# Patient Record
Sex: MALE | Race: WHITE | NOT HISPANIC OR LATINO | Employment: FULL TIME | URBAN - METROPOLITAN AREA
[De-identification: names, ages, dates, MRNs, and addresses within clinical notes are randomized per-mention and may not be internally consistent; named-entity substitution may affect disease eponyms.]

---

## 2017-06-08 ENCOUNTER — ANESTHESIA EVENT (OUTPATIENT)
Dept: SURGERY | Facility: HOSPITAL | Age: 32
End: 2017-06-08
Payer: OTHER MISCELLANEOUS

## 2017-06-08 ENCOUNTER — HOSPITAL ENCOUNTER (EMERGENCY)
Facility: HOSPITAL | Age: 32
Discharge: HOME | End: 2017-06-08
Attending: FAMILY MEDICINE
Payer: OTHER MISCELLANEOUS

## 2017-06-08 ENCOUNTER — HOSPITAL ENCOUNTER (OUTPATIENT)
Facility: HOSPITAL | Age: 32
LOS: 1 days | Discharge: HOME | End: 2017-06-10
Attending: EMERGENCY MEDICINE | Admitting: ORTHOPAEDIC SURGERY
Payer: OTHER MISCELLANEOUS

## 2017-06-08 ENCOUNTER — ANESTHESIA (OUTPATIENT)
Dept: SURGERY | Facility: HOSPITAL | Age: 32
End: 2017-06-08
Payer: OTHER MISCELLANEOUS

## 2017-06-08 VITALS
TEMPERATURE: 98 F | WEIGHT: 180 LBS | SYSTOLIC BLOOD PRESSURE: 122 MMHG | HEART RATE: 69 BPM | OXYGEN SATURATION: 99 % | RESPIRATION RATE: 18 BRPM | BODY MASS INDEX: 25.77 KG/M2 | HEIGHT: 70 IN | DIASTOLIC BLOOD PRESSURE: 64 MMHG

## 2017-06-08 DIAGNOSIS — S82.402A TIBIA/FIBULA FRACTURE, LEFT, CLOSED, INITIAL ENCOUNTER: Primary | ICD-10-CM
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
## 2017-06-08 DIAGNOSIS — S82.402A TIBIA/FIBULA FRACTURE, LEFT, CLOSED, INITIAL ENCOUNTER: ICD-10-CM

## 2017-06-08 DIAGNOSIS — S82.872A CLOSED DISPLACED PILON FRACTURE OF LEFT TIBIA: ICD-10-CM
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
## 2017-06-08 DIAGNOSIS — M79.606 LEG PAIN: ICD-10-CM

## 2017-06-08 DIAGNOSIS — S82.872A CLOSED DISPLACED PILON FRACTURE OF LEFT TIBIA, INITIAL ENCOUNTER: ICD-10-CM

## 2017-06-08 DIAGNOSIS — S82.202A TIBIA/FIBULA FRACTURE, LEFT, CLOSED, INITIAL ENCOUNTER: ICD-10-CM

## 2017-06-08 DIAGNOSIS — S82.872A CLOSED LEFT PILON FRACTURE, INITIAL ENCOUNTER: Primary | ICD-10-CM

## 2017-06-08 DIAGNOSIS — S82.202A TIBIA/FIBULA FRACTURE, LEFT, CLOSED, INITIAL ENCOUNTER: Primary | ICD-10-CM

## 2017-06-08 LAB
ALBUMIN SERPL BCP-MCNC: 4.3 G/DL
ALP SERPL-CCNC: 61 U/L
ALT SERPL W/O P-5'-P-CCNC: 25 U/L
ANION GAP SERPL CALC-SCNC: 10 MMOL/L
AST SERPL-CCNC: 33 U/L
BASOPHILS # BLD AUTO: 0.02 K/UL
BASOPHILS NFR BLD: 0.1 %
BILIRUB SERPL-MCNC: 0.6 MG/DL
BUN SERPL-MCNC: 10 MG/DL
CALCIUM SERPL-MCNC: 9.3 MG/DL
CHLORIDE SERPL-SCNC: 105 MMOL/L
CO2 SERPL-SCNC: 24 MMOL/L
CREAT SERPL-MCNC: 1 MG/DL
DIFFERENTIAL METHOD: ABNORMAL
EOSINOPHIL # BLD AUTO: 0 K/UL
EOSINOPHIL NFR BLD: 0.1 %
ERYTHROCYTE [DISTWIDTH] IN BLOOD BY AUTOMATED COUNT: 12.6 %
EST. GFR  (AFRICAN AMERICAN): >60 ML/MIN/1.73 M^2
EST. GFR  (NON AFRICAN AMERICAN): >60 ML/MIN/1.73 M^2
GLUCOSE SERPL-MCNC: 109 MG/DL
HCT VFR BLD AUTO: 42.8 %
HGB BLD-MCNC: 14.1 G/DL
INR PPP: 1
LYMPHOCYTES # BLD AUTO: 1.4 K/UL
LYMPHOCYTES NFR BLD: 6.6 %
MCH RBC QN AUTO: 30.7 PG
MCHC RBC AUTO-ENTMCNC: 32.9 %
MCV RBC AUTO: 93 FL
MONOCYTES # BLD AUTO: 0.9 K/UL
MONOCYTES NFR BLD: 4.2 %
NEUTROPHILS # BLD AUTO: 18.9 K/UL
NEUTROPHILS NFR BLD: 88.6 %
PLATELET # BLD AUTO: 215 K/UL
PMV BLD AUTO: 10.4 FL
POTASSIUM SERPL-SCNC: 3.7 MMOL/L
PROT SERPL-MCNC: 7.2 G/DL
PROTHROMBIN TIME: 10.6 SEC
RBC # BLD AUTO: 4.59 M/UL
SODIUM SERPL-SCNC: 139 MMOL/L
WBC # BLD AUTO: 21.29 K/UL

## 2017-06-08 PROCEDURE — 80053 COMPREHEN METABOLIC PANEL: CPT

## 2017-06-08 PROCEDURE — 71000033 HC RECOVERY, INTIAL HOUR: Performed by: ORTHOPAEDIC SURGERY

## 2017-06-08 PROCEDURE — 96374 THER/PROPH/DIAG INJ IV PUSH: CPT

## 2017-06-08 PROCEDURE — 37000008 HC ANESTHESIA 1ST 15 MINUTES: Performed by: ORTHOPAEDIC SURGERY

## 2017-06-08 PROCEDURE — 85610 PROTHROMBIN TIME: CPT

## 2017-06-08 PROCEDURE — 96372 THER/PROPH/DIAG INJ SC/IM: CPT

## 2017-06-08 PROCEDURE — 99220 PR INITIAL OBSERVATION CARE,LEVL III: CPT | Mod: 57,,, | Performed by: ORTHOPAEDIC SURGERY

## 2017-06-08 PROCEDURE — 63600175 PHARM REV CODE 636 W HCPCS: Performed by: ORTHOPAEDIC SURGERY

## 2017-06-08 PROCEDURE — 63600175 PHARM REV CODE 636 W HCPCS: Performed by: FAMILY MEDICINE

## 2017-06-08 PROCEDURE — 29515 APPLICATION SHORT LEG SPLINT: CPT | Mod: LT

## 2017-06-08 PROCEDURE — 37000009 HC ANESTHESIA EA ADD 15 MINS: Performed by: ORTHOPAEDIC SURGERY

## 2017-06-08 PROCEDURE — 99283 EMERGENCY DEPT VISIT LOW MDM: CPT | Mod: 27

## 2017-06-08 PROCEDURE — 27201423 OPTIME MED/SURG SUP & DEVICES STERILE SUPPLY: Performed by: ORTHOPAEDIC SURGERY

## 2017-06-08 PROCEDURE — 99285 EMERGENCY DEPT VISIT HI MDM: CPT | Mod: ,,, | Performed by: EMERGENCY MEDICINE

## 2017-06-08 PROCEDURE — G0378 HOSPITAL OBSERVATION PER HR: HCPCS

## 2017-06-08 PROCEDURE — 63600175 PHARM REV CODE 636 W HCPCS: Performed by: STUDENT IN AN ORGANIZED HEALTH CARE EDUCATION/TRAINING PROGRAM

## 2017-06-08 PROCEDURE — 20690 APPL UNIPLN UNI EXT FIXJ SYS: CPT | Mod: ,,, | Performed by: ORTHOPAEDIC SURGERY

## 2017-06-08 PROCEDURE — 25000003 PHARM REV CODE 250: Performed by: ORTHOPAEDIC SURGERY

## 2017-06-08 PROCEDURE — 99285 EMERGENCY DEPT VISIT HI MDM: CPT | Mod: 25

## 2017-06-08 PROCEDURE — C1713 ANCHOR/SCREW BN/BN,TIS/BN: HCPCS | Performed by: ORTHOPAEDIC SURGERY

## 2017-06-08 PROCEDURE — 71000039 HC RECOVERY, EACH ADD'L HOUR: Performed by: ORTHOPAEDIC SURGERY

## 2017-06-08 PROCEDURE — 36000708 HC OR TIME LEV III 1ST 15 MIN: Performed by: ORTHOPAEDIC SURGERY

## 2017-06-08 PROCEDURE — 36000709 HC OR TIME LEV III EA ADD 15 MIN: Performed by: ORTHOPAEDIC SURGERY

## 2017-06-08 PROCEDURE — 85025 COMPLETE CBC W/AUTO DIFF WBC: CPT

## 2017-06-08 PROCEDURE — 27825 TREAT LOWER LEG FRACTURE: CPT | Mod: 51,,, | Performed by: ORTHOPAEDIC SURGERY

## 2017-06-08 PROCEDURE — 96376 TX/PRO/DX INJ SAME DRUG ADON: CPT

## 2017-06-08 DEVICE — SCREW SCHANZ 5/60/150 294.784: Type: IMPLANTABLE DEVICE | Site: ANKLE | Status: FUNCTIONAL

## 2017-06-08 DEVICE — PIN STNMN FIX EXT 5X250MM SS: Type: IMPLANTABLE DEVICE | Site: ANKLE | Status: FUNCTIONAL

## 2017-06-08 RX ORDER — SODIUM CHLORIDE 9 MG/ML
INJECTION, SOLUTION INTRAVENOUS CONTINUOUS
Status: DISCONTINUED | OUTPATIENT
Start: 2017-06-08 | End: 2017-06-10 | Stop reason: HOSPADM

## 2017-06-08 RX ORDER — HYDROMORPHONE HYDROCHLORIDE 1 MG/ML
0.5 INJECTION, SOLUTION INTRAMUSCULAR; INTRAVENOUS; SUBCUTANEOUS
Status: COMPLETED | OUTPATIENT
Start: 2017-06-08 | End: 2017-06-08

## 2017-06-08 RX ORDER — HYDROMORPHONE HYDROCHLORIDE 1 MG/ML
1 INJECTION, SOLUTION INTRAMUSCULAR; INTRAVENOUS; SUBCUTANEOUS
Status: COMPLETED | OUTPATIENT
Start: 2017-06-08 | End: 2017-06-08

## 2017-06-08 RX ORDER — HYDROCODONE BITARTRATE AND ACETAMINOPHEN 10; 325 MG/1; MG/1
1 TABLET ORAL EVERY 4 HOURS PRN
Qty: 18 TABLET | Refills: 0 | Status: ON HOLD | OUTPATIENT
Start: 2017-06-08 | End: 2017-06-28 | Stop reason: HOSPADM

## 2017-06-08 RX ORDER — ONDANSETRON 2 MG/ML
4 INJECTION INTRAMUSCULAR; INTRAVENOUS
Status: COMPLETED | OUTPATIENT
Start: 2017-06-08 | End: 2017-06-08

## 2017-06-08 RX ORDER — SODIUM CHLORIDE 0.9 % (FLUSH) 0.9 %
3 SYRINGE (ML) INJECTION
Status: DISCONTINUED | OUTPATIENT
Start: 2017-06-08 | End: 2017-06-10 | Stop reason: HOSPADM

## 2017-06-08 RX ORDER — MUPIROCIN 20 MG/G
1 OINTMENT TOPICAL
Status: CANCELLED | OUTPATIENT
Start: 2017-06-08

## 2017-06-08 RX ORDER — HYDROMORPHONE HYDROCHLORIDE 1 MG/ML
0.5 INJECTION, SOLUTION INTRAMUSCULAR; INTRAVENOUS; SUBCUTANEOUS
Status: DISCONTINUED | OUTPATIENT
Start: 2017-06-08 | End: 2017-06-10 | Stop reason: HOSPADM

## 2017-06-08 RX ADMIN — ONDANSETRON 4 MG: 2 INJECTION INTRAMUSCULAR; INTRAVENOUS at 10:06

## 2017-06-08 RX ADMIN — HYDROMORPHONE HYDROCHLORIDE 1 MG: 1 INJECTION, SOLUTION INTRAMUSCULAR; INTRAVENOUS; SUBCUTANEOUS at 03:06

## 2017-06-08 RX ADMIN — SODIUM CHLORIDE: 0.9 INJECTION, SOLUTION INTRAVENOUS at 09:06

## 2017-06-08 RX ADMIN — HYDROMORPHONE HYDROCHLORIDE 1 MG: 1 INJECTION, SOLUTION INTRAMUSCULAR; INTRAVENOUS; SUBCUTANEOUS at 06:06

## 2017-06-08 RX ADMIN — HYDROMORPHONE HYDROCHLORIDE 0.5 MG: 1 INJECTION, SOLUTION INTRAMUSCULAR; INTRAVENOUS; SUBCUTANEOUS at 10:06

## 2017-06-08 RX ADMIN — HYDROMORPHONE HYDROCHLORIDE 0.5 MG: 1 INJECTION, SOLUTION INTRAMUSCULAR; INTRAVENOUS; SUBCUTANEOUS at 07:06

## 2017-06-08 NOTE — ED TRIAGE NOTES
I was at work and working and [employer] mat when it was lifted shot my way and hit me in the left lower leg. Obvious deformity to left lower leg. Lower leg splint placed by work crew with ace wrap.

## 2017-06-08 NOTE — ED PROVIDER NOTES
"Encounter Date: 6/8/2017       History     Chief Complaint   Patient presents with    Leg Pain     I was at work and working and wooden duane mat when it was lifted shot my way and hit me in the left lower leg. Obvious deformity to left lower leg.      Review of patient's allergies indicates:  No Known Allergies  31-year-old male injured his right leg while moving heavy wooden pallets ("pads").  Patient states was attempting to adjust the raking at which time the pad began to swing and hit him in the left leg.  Did attempt to turn run but was too slow.  An obvious deformity was noted and the leg was splinted on scene.  States the pain is presently a 1-2 out of 10 in intensity.          History reviewed. No pertinent past medical history.  Past Surgical History:   Procedure Laterality Date    leg       fractured     History reviewed. No pertinent family history.  Social History   Substance Use Topics    Smoking status: Current Every Day Smoker     Packs/day: 0.75     Types: Cigarettes    Smokeless tobacco: Not on file    Alcohol use Yes      Comment: weekends only     Review of Systems   Respiratory: Negative for shortness of breath.    Cardiovascular: Negative for chest pain.   All other systems reviewed and are negative.      Physical Exam     Initial Vitals [06/08/17 1024]   BP Pulse Resp Temp SpO2   126/70 84 15 97.7 °F (36.5 °C) 98 %     Physical Exam    Nursing note and vitals reviewed.  Constitutional: He appears well-developed and well-nourished.   HENT:   Head: Normocephalic and atraumatic.   Eyes: EOM are normal. Pupils are equal, round, and reactive to light.   Neck: Normal range of motion. Neck supple.   Cardiovascular: Normal rate, regular rhythm, normal heart sounds and intact distal pulses.   Pulmonary/Chest: Breath sounds normal.   Abdominal: Soft.   Musculoskeletal: He exhibits tenderness. He exhibits no edema.   Obvious left tib-fib fracture bruising noted no open fractures observed no skin " tenting.   Neurological: He is alert and oriented to person, place, and time. No sensory deficit.   Skin: Skin is warm. Capillary refill takes less than 2 seconds.   Psychiatric: He has a normal mood and affect. His behavior is normal.         ED Course   Splint Application  Date/Time: 6/8/2017 11:25 AM  Performed by: ABRAM JOSHI  Authorized by: ABRAM JOSHI   Consent Done: Not Needed  Location details: left leg  Supplies used: cotton padding,  elastic bandage and Ortho-Glass  Post-procedure: The splinted body part was neurovascularly unchanged following the procedure.  Patient tolerance: Patient tolerated the procedure well with no immediate complications        Labs Reviewed - No data to display                            ED Course     Clinical Impression:   The primary encounter diagnosis was Tibia/fibula fracture, left, closed, initial encounter. A diagnosis of Leg pain was also pertinent to this visit.          Abram Joshi MD  06/08/17 1148

## 2017-06-08 NOTE — ANESTHESIA PREPROCEDURE EVALUATION
Pre-operative evaluation for Procedure(s) (LRB):  APPLICATION-EXTERNAL FIXATION DEVICE - left ankle spanning (Left)    Nick Medel is a 31 y.o. male w/ PMHx of closed pilon fx of L tibia who presents for above procedure. No other significant PMHx. No hx of anesthesia complications.     LDA:   L forearm 20 g PIV     Prev airway: none on record       Patient Active Problem List   Diagnosis    Closed displaced pilon fracture of left tibia       Review of patient's allergies indicates:  No Known Allergies     No current facility-administered medications on file prior to encounter.      Current Outpatient Prescriptions on File Prior to Encounter   Medication Sig Dispense Refill    hydrocodone-acetaminophen 10-325mg (NORCO)  mg Tab Take 1 tablet by mouth every 4 (four) hours as needed for Pain. 18 tablet 0       Past Surgical History:   Procedure Laterality Date    leg       fractured    LEG SURGERY Left        Social History     Social History    Marital status: Single     Spouse name: N/A    Number of children: N/A    Years of education: N/A     Occupational History    Not on file.     Social History Main Topics    Smoking status: Current Every Day Smoker     Packs/day: 0.75     Types: Cigarettes    Smokeless tobacco: Not on file    Alcohol use Yes      Comment: weekends only    Drug use: No    Sexual activity: Not on file     Other Topics Concern    Not on file     Social History Narrative    No narrative on file         Vital Signs Range (Last 24H):  Temp:  [36.5 °C (97.7 °F)-36.8 °C (98.3 °F)]   Pulse:  [66-84]   Resp:  [15-18]   BP: (122-146)/(64-83)   SpO2:  [98 %-99 %]       CBC:   Recent Labs      06/08/17   1539   WBC  21.29*   RBC  4.59*   HGB  14.1   HCT  42.8   PLT  215   MCV  93   MCH  30.7   MCHC  32.9       CMP:   Recent Labs      06/08/17   1539   NA  139   K  3.7   CL  105   CO2  24   BUN  10   CREATININE  1.0   GLU  109    CALCIUM  9.3   ALBUMIN  4.3   PROT  7.2   ALKPHOS  61   ALT  25   AST  33   BILITOT  0.6       INR  Recent Labs      06/08/17   1539   INR  1.0       Anesthesia Evaluation    I have reviewed the Patient Summary Reports.     I have reviewed the Medications.     Review of Systems  Anesthesia Hx:  No problems with previous Anesthesia Denies Hx of Anesthetic complications  History of prior surgery of interest to airway management or planning: Previous anesthesia: General  Denies Personal Hx of Anesthesia complications.   Social:  Smoker, Social Alcohol Use    Hematology/Oncology:  Hematology Normal   Oncology Normal     EENT/Dental:EENT/Dental Normal   Cardiovascular:  Cardiovascular Normal     Pulmonary:  Pulmonary Normal    Renal/:  Renal/ Normal     Hepatic/GI:  Hepatic/GI Normal    Musculoskeletal:   LE Fx per HPI    Neurological:  Neurology Normal    Endocrine:  Endocrine Normal    Dermatological:  Skin Normal    Psych:  Psychiatric Normal           Physical Exam  General:  Well nourished    Airway/Jaw/Neck:  Airway Findings: Mouth Opening: Normal Tongue: Normal  General Airway Assessment: Adult  Mallampati: II  Improves to I with phonation.  TM Distance: Normal, at least 6 cm  Jaw/Neck Findings:  Neck ROM: Normal ROM     Eyes/Ears/Nose:  EYES/EARS/NOSE FINDINGS: Normal   Dental:  Dental Findings: In tact, upper partial dentures        Mental Status:  Mental Status Findings:  Cooperative, Alert and Oriented         Anesthesia Plan  Type of Anesthesia, risks & benefits discussed:  Anesthesia Type:  spinal, general  Patient's Preference: GA  Intra-op Monitoring Plan: standard ASA monitors  Intra-op Monitoring Plan Comments:   Post Op Pain Control Plan: IV/PO Opioids PRN  Post Op Pain Control Plan Comments:   Induction:   IV  Beta Blocker:  Patient is not currently on a Beta-Blocker (No further documentation required).       Informed Consent: Patient understands risks and agrees with Anesthesia plan.  Questions  answered. Anesthesia consent signed with patient.  ASA Score: 2     Day of Surgery Review of History & Physical:  There are no significant changes.  H&P update referred to the surgeon.         Ready For Surgery From Anesthesia Perspective.

## 2017-06-08 NOTE — ED NOTES
Ortho paged, informed patient c/o toes tingling. Cap refill <3, + pedal pulses. Ordered to elevate and keep ice. Will monitor  Call to [phone] with further concerns

## 2017-06-08 NOTE — HPI
Patient is a 31 y.o. male with h/o left gavin fx s/p ORIF (~2007 [place]) presents with left ankle pain.  He was working when a boom hit his left ankle.  He noticed immediate pain and inability to bear weight and was taken to [facility] where he was placed in a splint and directed to follow up with orthopedic surgeon.  They elected to [facility].  Endorses some paresthesias diffusely to foot.  No other injuries.

## 2017-06-08 NOTE — H&P
[facility]  Orthopedics  H&P    Patient Name: Nick Medel  MRN: [id number]  Admission Date: 6/8/2017  Primary Care Provider: Primary Doctor No    Patient information was obtained from patient and ER records.     Subjective:     Principal Problem:Closed displaced pilon fracture of left tibia    Chief Complaint:   Chief Complaint   Patient presents with    Tib/Fib Fracture/Transfer        HPI: Patient is a 31 y.o. male with h/o left gavin fx s/p ORIF (~2007 [place]) presents with left ankle pain.  He was working when a boom hit his left ankle.  He noticed immediate pain and inability to bear weight and was taken to [facility] where he was placed in a splint and directed to follow up with orthopedic surgeon.  They elected to come to [facility] ED.  Endorses some paresthesias diffusely to foot.  No other injuries.    History reviewed. No pertinent past medical history.    Past Surgical History:   Procedure Laterality Date    leg       fractured    LEG SURGERY Left        Review of patient's allergies indicates:  No Known Allergies    No current facility-administered medications for this encounter.      Current Outpatient Prescriptions   Medication Sig    hydrocodone-acetaminophen 10-325mg (NORCO)  mg Tab Take 1 tablet by mouth every 4 (four) hours as needed for Pain.     Family History     None        Social History Main Topics    Smoking status: Current Every Day Smoker     Packs/day: 0.75     Types: Cigarettes    Smokeless tobacco: Not on file    Alcohol use Yes      Comment: weekends only    Drug use: No    Sexual activity: Not on file     ROS     See HPI    Objective:     Vital Signs (Most Recent):  Temp: 98.3 °F (36.8 °C) (06/08/17 1422)  Pulse: 66 (06/08/17 1422)  Resp: 16 (06/08/17 1422)  BP: (!) 146/83 (06/08/17 1422)  SpO2: 99 % (06/08/17 1422) Vital Signs (24h Range):  Temp:  [97.7 °F (36.5 °C)-98.3 °F (36.8 °C)] 98.3 °F (36.8 °C)  Pulse:  [66-84] 66  Resp:  [15-18]  "16  SpO2:  [98 %-99 %] 99 %  BP: (122-146)/(64-83) 146/83     Weight: 81.6 kg (180 lb)  Height: 5' 10" (177.8 cm)  Body mass index is 25.83 kg/m².    No intake or output data in the 24 hours ending 06/08/17 1628    Ortho/SPM Exam    Gen:  No acute distress  CV:  Peripherally well-perfused.  Pulses 2+ bilaterally.  Lungs:  Normal respiratory effort.  Head/Neck:  Normocephalic.  Atraumatic.   Neuro:  CN intact without deficit, SILT throughout B/L Upper & Lower Extremities  Pelvis: No TTP, Stable to direct anterior pressure over ASIS.    MSK:  LLE:  - moderate-severe swelling to lower leg  - 2 superficial scrapes anterior, do not probe deep  - all compartments assessed in isolation, all are very swollen but are compressible  - ttp to distal tibia and fibular shaft  - no pain with PROM of foot/toes/ankle  - limited AROM foot/ankle 2/2 pain  - TA/EHL/Gastroc/FHL assessed in isolation without deficit  - SILT throughout  - DP and PT palpated  2+  - Capillary Refill <3s       Significant Labs:   BMP: No results for input(s): GLU, NA, K, CL, CO2, BUN, CREATININE, CALCIUM, MG in the last 48 hours.  CBC:   Recent Labs  Lab 06/08/17  1539   WBC 21.29*   HGB 14.1   HCT 42.8        All pertinent labs within the past 24 hours have been reviewed.    Significant Imaging:   Left pilon and fibular shaft fx    Assessment/Plan:     * Closed displaced pilon fracture of left tibia    Currently does not have compartment syndrome, however he is quite swollen and there is concern for potential development of this.    - will admit to orthopedics and take emergently to OR for ex-fix: booked/marked/consent obtained  - NPO  - pain control  - elevate/ice at all times  - bed rest            Mohit Cisneros MD  Orthopedics  [facility]    "

## 2017-06-08 NOTE — ASSESSMENT & PLAN NOTE
Currently does not have compartment syndrome, however he is quite swollen and there is concern for potential development of this.    - will admit to orthopedics and take emergently to OR for ex-fix: booked/marked/consent obtained  - NPO  - pain control  - elevate/ice at all times  - bed rest

## 2017-06-08 NOTE — ED NOTES
Dr. Hanson's office contacted.  Report given to Nancy due to Dr. Hanson in [place].  Nancy requested pt present to office as soon as TRENT'd.  Dr. Adi paredes.

## 2017-06-08 NOTE — ED PROVIDER NOTES
Encounter Date: 6/8/2017       History     Chief Complaint   Patient presents with    Tib/Fib Fracture/Transfer     Review of patient's allergies indicates:  No Known Allergies  Mr. Medel is a 31 year old male with past medical history significant for a prior fibula fracture who presents as a transfer for management of tib/fib facture. He reports that he sustained the injury while at work. His left lower extremity was pinned between two heavy rugs. He was taken to the closest hospital for evaluation. While there xrays were taken. Xrays revealed comminuted and displaced fractures of the distal tibial shaft and mid fibular shaft. He reports numbness and tingling in his left distal lower extremity. He endorses significant pain. He is unable to bare weight on his lower extremity. He states that he had a prior fibular fracture which was repaired at [facility]. He reports that he believes that he has received a tetanus shot within the last 5 years. He also states that he received antibiotics at the outside facility.           History reviewed. No pertinent past medical history.  Past Surgical History:   Procedure Laterality Date    leg       fractured    LEG SURGERY Left      History reviewed. No pertinent family history.  Social History   Substance Use Topics    Smoking status: Current Every Day Smoker     Packs/day: 0.75     Types: Cigarettes    Smokeless tobacco: Not on file    Alcohol use Yes      Comment: weekends only     Review of Systems   Constitutional: Negative for chills, diaphoresis and fever.   HENT: Negative for congestion, sneezing and sore throat.    Eyes: Negative for visual disturbance.   Respiratory: Negative for cough, chest tightness, shortness of breath and wheezing.    Cardiovascular: Negative for chest pain, palpitations and leg swelling.   Gastrointestinal: Negative for abdominal distention, abdominal pain, constipation, diarrhea, nausea and vomiting.   Genitourinary: Negative for decreased  urine volume, difficulty urinating, dysuria, flank pain, frequency and urgency.   Musculoskeletal: Negative for neck pain and neck stiffness.   Skin: Negative for pallor, rash and wound.   Allergic/Immunologic: Negative for immunocompromised state.   Neurological: Positive for numbness. Negative for dizziness, tremors, weakness, light-headedness and headaches.        Tingling      Hematological: Does not bruise/bleed easily.   Psychiatric/Behavioral: Negative for agitation and behavioral problems.       Physical Exam     Initial Vitals [06/08/17 1422]   BP Pulse Resp Temp SpO2   (!) 146/83 66 16 98.3 °F (36.8 °C) 99 %     Physical Exam    Nursing note and vitals reviewed.  Constitutional: He appears well-developed and well-nourished. He is not diaphoretic. He appears distressed.   HENT:   Head: Normocephalic.   Eyes: Conjunctivae and EOM are normal. Pupils are equal, round, and reactive to light. No scleral icterus.   Neck: Normal range of motion. Neck supple. No JVD present.   Cardiovascular: Normal rate, regular rhythm, normal heart sounds and intact distal pulses. Exam reveals no gallop and no friction rub.    No murmur heard.  Pulmonary/Chest: Breath sounds normal. No respiratory distress. He has no wheezes. He has no rhonchi. He has no rales. He exhibits no tenderness.   Abdominal: Soft. Bowel sounds are normal. He exhibits no distension and no mass. There is no tenderness. There is no rebound and no guarding.   Musculoskeletal: Normal range of motion. He exhibits edema and tenderness.   Patient's dressing was taken down with orthopedic surgery. Two lacerations on the proximal and middle medial shin appreciated. Significant edema of his lower extremity is present. In addition he has scatter ecchymosis.    Neurological: He is alert and oriented to person, place, and time. He has normal strength and normal reflexes. He displays normal reflexes. No cranial nerve deficit or sensory deficit.   Skin: Skin is warm and  dry. No pallor.   Psychiatric: He has a normal mood and affect. Thought content normal.         ED Course   Procedures  Labs Reviewed   CBC W/ AUTO DIFFERENTIAL - Abnormal; Notable for the following:        Result Value    WBC 21.29 (*)     RBC 4.59 (*)     Gran # 18.9 (*)     Gran% 88.6 (*)     Lymph% 6.6 (*)     All other components within normal limits   COMPREHENSIVE METABOLIC PANEL   PROTIME-INR             Medical Decision Making:   Initial Assessment:   Mr. Medel is a 31 year old male who presents as a transfer for orthopedic evaluation for a tib/fib fracture. Xrays reviewed. Patient was in significant pain on arrival. IV dilaudid was ordered for patient comfort. Patient reports numbness and tingling in his lower extremity. Dressing was taken down. Patients sensation and pulses are intact. Significant swelling appreciated. Orthopedic surgery was consulted for urgent evaluation.   Differential Diagnosis:   Differential diagnosis is a tibia and fib fracture.    ED Management:  He was seen at 1500.  He was discussed with staff at 1510.  Orthopedic surgery was consulted for evaluation. Orthopedic surgery evaluated the patient at 1515.  Orthopedic surgery to order ankle and knee series xrays.   Patient will be taken for fixation and then subsequently admitted to the orthopedic service for further management and care.               Attending Attestation:   Physician Attestation Statement for Resident:  As the supervising MD   Physician Attestation Statement: I have personally seen and examined this patient.   I agree with the above history. -: Emergent evaluation of left tib/fib fracture transferred from outside hospital. Has firm calf but no other evidence of compartment syndrome, 2+ pulse in DP. Will get pre-op labs, patient will be admitted to ortho for operative management.    As the supervising MD I agree with the above PE.    As the supervising MD I agree with the above treatment, course, plan, and  disposition.  I have reviewed and agree with the residents interpretation of the following: lab data and x-rays.  I have reviewed the following: old records at this facility.                    ED Course     Clinical Impression:   The primary encounter diagnosis was Closed left pilon fracture, initial encounter. Diagnoses of Tibia/fibula fracture, left, closed, initial encounter and Closed displaced pilon fracture of left tibia, initial encounter were also pertinent to this visit.    Disposition:   Disposition: Admitted  Condition: Stephanie Dallas MD  Resident  06/09/17 0615       David Dejesus MD  06/09/17 9480

## 2017-06-08 NOTE — SUBJECTIVE & OBJECTIVE
"History reviewed. No pertinent past medical history.    Past Surgical History:   Procedure Laterality Date    leg       fractured    LEG SURGERY Left        Review of patient's allergies indicates:  No Known Allergies    No current facility-administered medications for this encounter.      Current Outpatient Prescriptions   Medication Sig    hydrocodone-acetaminophen 10-325mg (NORCO)  mg Tab Take 1 tablet by mouth every 4 (four) hours as needed for Pain.     Family History     None        Social History Main Topics    Smoking status: Current Every Day Smoker     Packs/day: 0.75     Types: Cigarettes    Smokeless tobacco: Not on file    Alcohol use Yes      Comment: weekends only    Drug use: No    Sexual activity: Not on file     ROS     See HPI    Objective:     Vital Signs (Most Recent):  Temp: 98.3 °F (36.8 °C) (06/08/17 1422)  Pulse: 66 (06/08/17 1422)  Resp: 16 (06/08/17 1422)  BP: (!) 146/83 (06/08/17 1422)  SpO2: 99 % (06/08/17 1422) Vital Signs (24h Range):  Temp:  [97.7 °F (36.5 °C)-98.3 °F (36.8 °C)] 98.3 °F (36.8 °C)  Pulse:  [66-84] 66  Resp:  [15-18] 16  SpO2:  [98 %-99 %] 99 %  BP: (122-146)/(64-83) 146/83     Weight: 81.6 kg (180 lb)  Height: 5' 10" (177.8 cm)  Body mass index is 25.83 kg/m².    No intake or output data in the 24 hours ending 06/08/17 1628    Ortho/SPM Exam    Gen:  No acute distress  CV:  Peripherally well-perfused.  Pulses 2+ bilaterally.  Lungs:  Normal respiratory effort.  Head/Neck:  Normocephalic.  Atraumatic.   Neuro:  CN intact without deficit, SILT throughout B/L Upper & Lower Extremities  Pelvis: No TTP, Stable to direct anterior pressure over ASIS.    MSK:  LLE:  - moderate-severe swelling to lower leg  - 2 superficial scrapes anterior, do not probe deep  - all compartments assessed in isolation, all are very swollen but are compressible  - ttp to distal tibia and fibular shaft  - no pain with PROM of foot/toes/ankle  - limited AROM foot/ankle 2/2 pain  - " TA/EHL/Gastroc/FHL assessed in isolation without deficit  - SILT throughout  - DP and PT palpated  2+  - Capillary Refill <3s       Significant Labs:   BMP: No results for input(s): GLU, NA, K, CL, CO2, BUN, CREATININE, CALCIUM, MG in the last 48 hours.  CBC:   Recent Labs  Lab 06/08/17  1539   WBC 21.29*   HGB 14.1   HCT 42.8        All pertinent labs within the past 24 hours have been reviewed.    Significant Imaging:   Left pilon and fibular shaft fx

## 2017-06-09 DIAGNOSIS — S82.872A CLOSED TRAUMATIC DISPLACED FRACTURE OF LEFT TIBIAL PLAFOND: Primary | ICD-10-CM

## 2017-06-09 PROCEDURE — 25000003 PHARM REV CODE 250: Performed by: STUDENT IN AN ORGANIZED HEALTH CARE EDUCATION/TRAINING PROGRAM

## 2017-06-09 PROCEDURE — G0378 HOSPITAL OBSERVATION PER HR: HCPCS

## 2017-06-09 PROCEDURE — G8978 MOBILITY CURRENT STATUS: HCPCS | Mod: CH

## 2017-06-09 PROCEDURE — 97165 OT EVAL LOW COMPLEX 30 MIN: CPT

## 2017-06-09 PROCEDURE — G8987 SELF CARE CURRENT STATUS: HCPCS | Mod: CI

## 2017-06-09 PROCEDURE — G8979 MOBILITY GOAL STATUS: HCPCS | Mod: CH

## 2017-06-09 PROCEDURE — G8989 SELF CARE D/C STATUS: HCPCS | Mod: CI

## 2017-06-09 PROCEDURE — G8980 MOBILITY D/C STATUS: HCPCS | Mod: CH

## 2017-06-09 PROCEDURE — 25000003 PHARM REV CODE 250: Performed by: ORTHOPAEDIC SURGERY

## 2017-06-09 PROCEDURE — 36000708 HC OR TIME LEV III 1ST 15 MIN: Performed by: ORTHOPAEDIC SURGERY

## 2017-06-09 PROCEDURE — G8988 SELF CARE GOAL STATUS: HCPCS | Mod: CI

## 2017-06-09 PROCEDURE — 97161 PT EVAL LOW COMPLEX 20 MIN: CPT

## 2017-06-09 PROCEDURE — D9220A PRA ANESTHESIA: Mod: ,,, | Performed by: ANESTHESIOLOGY

## 2017-06-09 PROCEDURE — 63600175 PHARM REV CODE 636 W HCPCS: Performed by: STUDENT IN AN ORGANIZED HEALTH CARE EDUCATION/TRAINING PROGRAM

## 2017-06-09 PROCEDURE — 25000003 PHARM REV CODE 250: Performed by: NURSE ANESTHETIST, CERTIFIED REGISTERED

## 2017-06-09 PROCEDURE — 97116 GAIT TRAINING THERAPY: CPT

## 2017-06-09 PROCEDURE — 63600175 PHARM REV CODE 636 W HCPCS: Performed by: NURSE ANESTHETIST, CERTIFIED REGISTERED

## 2017-06-09 PROCEDURE — 36000709 HC OR TIME LEV III EA ADD 15 MIN: Performed by: ORTHOPAEDIC SURGERY

## 2017-06-09 RX ORDER — ONDANSETRON 8 MG/1
8 TABLET, ORALLY DISINTEGRATING ORAL EVERY 6 HOURS PRN
Qty: 60 TABLET | Refills: 0 | Status: SHIPPED | OUTPATIENT
Start: 2017-06-09

## 2017-06-09 RX ORDER — DOCUSATE SODIUM 100 MG/1
100 CAPSULE, LIQUID FILLED ORAL 2 TIMES DAILY
Qty: 60 CAPSULE | Refills: 0 | Status: SHIPPED | OUTPATIENT
Start: 2017-06-09 | End: 2017-07-07

## 2017-06-09 RX ORDER — PROPOFOL 10 MG/ML
VIAL (ML) INTRAVENOUS
Status: DISCONTINUED | OUTPATIENT
Start: 2017-06-09 | End: 2017-06-09

## 2017-06-09 RX ORDER — CEFAZOLIN SODIUM 1 G/50ML
1 SOLUTION INTRAVENOUS
Status: COMPLETED | OUTPATIENT
Start: 2017-06-09 | End: 2017-06-09

## 2017-06-09 RX ORDER — ONDANSETRON 2 MG/ML
4 INJECTION INTRAMUSCULAR; INTRAVENOUS ONCE AS NEEDED
Status: DISCONTINUED | OUTPATIENT
Start: 2017-06-09 | End: 2017-06-09

## 2017-06-09 RX ORDER — MUPIROCIN 20 MG/G
1 OINTMENT TOPICAL
Status: CANCELLED | OUTPATIENT
Start: 2017-06-09

## 2017-06-09 RX ORDER — LIDOCAINE HCL/PF 100 MG/5ML
SYRINGE (ML) INTRAVENOUS
Status: DISCONTINUED | OUTPATIENT
Start: 2017-06-09 | End: 2017-06-09

## 2017-06-09 RX ORDER — OXYCODONE HYDROCHLORIDE 5 MG/1
15 TABLET ORAL EVERY 4 HOURS PRN
Status: DISCONTINUED | OUTPATIENT
Start: 2017-06-09 | End: 2017-06-10 | Stop reason: HOSPADM

## 2017-06-09 RX ORDER — FENTANYL CITRATE 50 UG/ML
INJECTION, SOLUTION INTRAMUSCULAR; INTRAVENOUS
Status: DISCONTINUED | OUTPATIENT
Start: 2017-06-09 | End: 2017-06-09

## 2017-06-09 RX ORDER — ROCURONIUM BROMIDE 10 MG/ML
INJECTION, SOLUTION INTRAVENOUS
Status: DISCONTINUED | OUTPATIENT
Start: 2017-06-09 | End: 2017-06-09

## 2017-06-09 RX ORDER — ACETAMINOPHEN 325 MG/1
650 TABLET ORAL EVERY 8 HOURS PRN
Status: DISCONTINUED | OUTPATIENT
Start: 2017-06-09 | End: 2017-06-10 | Stop reason: HOSPADM

## 2017-06-09 RX ORDER — ONDANSETRON 8 MG/1
8 TABLET, ORALLY DISINTEGRATING ORAL EVERY 8 HOURS PRN
Status: DISCONTINUED | OUTPATIENT
Start: 2017-06-09 | End: 2017-06-10 | Stop reason: HOSPADM

## 2017-06-09 RX ORDER — OXYCODONE HYDROCHLORIDE 5 MG/1
5 TABLET ORAL EVERY 4 HOURS PRN
Status: DISCONTINUED | OUTPATIENT
Start: 2017-06-09 | End: 2017-06-10 | Stop reason: HOSPADM

## 2017-06-09 RX ORDER — MIDAZOLAM HYDROCHLORIDE 1 MG/ML
INJECTION, SOLUTION INTRAMUSCULAR; INTRAVENOUS
Status: DISCONTINUED | OUTPATIENT
Start: 2017-06-09 | End: 2017-06-09

## 2017-06-09 RX ORDER — DIPHENHYDRAMINE HYDROCHLORIDE 50 MG/ML
25 INJECTION INTRAMUSCULAR; INTRAVENOUS EVERY 4 HOURS PRN
Status: DISCONTINUED | OUTPATIENT
Start: 2017-06-09 | End: 2017-06-10 | Stop reason: HOSPADM

## 2017-06-09 RX ORDER — HYDROMORPHONE HYDROCHLORIDE 1 MG/ML
1 INJECTION, SOLUTION INTRAMUSCULAR; INTRAVENOUS; SUBCUTANEOUS EVERY 4 HOURS PRN
Status: DISCONTINUED | OUTPATIENT
Start: 2017-06-09 | End: 2017-06-10 | Stop reason: HOSPADM

## 2017-06-09 RX ORDER — NEOSTIGMINE METHYLSULFATE 1 MG/ML
INJECTION, SOLUTION INTRAVENOUS
Status: DISCONTINUED | OUTPATIENT
Start: 2017-06-09 | End: 2017-06-09

## 2017-06-09 RX ORDER — ONDANSETRON 2 MG/ML
INJECTION INTRAMUSCULAR; INTRAVENOUS
Status: DISCONTINUED | OUTPATIENT
Start: 2017-06-09 | End: 2017-06-09

## 2017-06-09 RX ORDER — ENOXAPARIN SODIUM 100 MG/ML
40 INJECTION SUBCUTANEOUS DAILY
Qty: 12 ML | Refills: 0 | Status: SHIPPED | OUTPATIENT
Start: 2017-06-09 | End: 2017-06-27

## 2017-06-09 RX ORDER — ENOXAPARIN SODIUM 100 MG/ML
40 INJECTION SUBCUTANEOUS
Status: DISCONTINUED | OUTPATIENT
Start: 2017-06-09 | End: 2017-06-10 | Stop reason: HOSPADM

## 2017-06-09 RX ORDER — OXYCODONE HYDROCHLORIDE 5 MG/1
10 TABLET ORAL EVERY 4 HOURS PRN
Status: DISCONTINUED | OUTPATIENT
Start: 2017-06-09 | End: 2017-06-10 | Stop reason: HOSPADM

## 2017-06-09 RX ORDER — CEFAZOLIN SODIUM 1 G/3ML
INJECTION, POWDER, FOR SOLUTION INTRAMUSCULAR; INTRAVENOUS
Status: DISCONTINUED | OUTPATIENT
Start: 2017-06-09 | End: 2017-06-09

## 2017-06-09 RX ORDER — OXYCODONE AND ACETAMINOPHEN 10; 325 MG/1; MG/1
1 TABLET ORAL EVERY 4 HOURS PRN
Qty: 90 TABLET | Refills: 0 | Status: ON HOLD | OUTPATIENT
Start: 2017-06-09 | End: 2017-06-28 | Stop reason: HOSPADM

## 2017-06-09 RX ORDER — SODIUM CHLORIDE 0.9 % (FLUSH) 0.9 %
3 SYRINGE (ML) INJECTION EVERY 8 HOURS
Status: DISCONTINUED | OUTPATIENT
Start: 2017-06-09 | End: 2017-06-10 | Stop reason: HOSPADM

## 2017-06-09 RX ORDER — SODIUM CHLORIDE 9 MG/ML
INJECTION, SOLUTION INTRAVENOUS CONTINUOUS
Status: DISCONTINUED | OUTPATIENT
Start: 2017-06-09 | End: 2017-06-10 | Stop reason: HOSPADM

## 2017-06-09 RX ORDER — GLYCOPYRROLATE 0.2 MG/ML
INJECTION INTRAMUSCULAR; INTRAVENOUS
Status: DISCONTINUED | OUTPATIENT
Start: 2017-06-09 | End: 2017-06-09

## 2017-06-09 RX ADMIN — HYDROMORPHONE HYDROCHLORIDE 1 MG: 1 INJECTION, SOLUTION INTRAMUSCULAR; INTRAVENOUS; SUBCUTANEOUS at 03:06

## 2017-06-09 RX ADMIN — ROCURONIUM BROMIDE 5 MG: 10 INJECTION, SOLUTION INTRAVENOUS at 12:06

## 2017-06-09 RX ADMIN — CEFAZOLIN SODIUM 1 G: 1 SOLUTION INTRAVENOUS at 06:06

## 2017-06-09 RX ADMIN — FENTANYL CITRATE 50 MCG: 50 INJECTION, SOLUTION INTRAMUSCULAR; INTRAVENOUS at 01:06

## 2017-06-09 RX ADMIN — MIDAZOLAM HYDROCHLORIDE 2 MG: 1 INJECTION, SOLUTION INTRAMUSCULAR; INTRAVENOUS at 12:06

## 2017-06-09 RX ADMIN — CEFAZOLIN 2 G: 1 INJECTION, POWDER, FOR SOLUTION INTRAVENOUS at 12:06

## 2017-06-09 RX ADMIN — Medication 3 ML: at 06:06

## 2017-06-09 RX ADMIN — FENTANYL CITRATE 50 MCG: 50 INJECTION, SOLUTION INTRAMUSCULAR; INTRAVENOUS at 12:06

## 2017-06-09 RX ADMIN — OXYCODONE HYDROCHLORIDE 15 MG: 5 TABLET ORAL at 10:06

## 2017-06-09 RX ADMIN — OXYCODONE HYDROCHLORIDE 15 MG: 5 TABLET ORAL at 12:06

## 2017-06-09 RX ADMIN — SODIUM CHLORIDE: 0.9 INJECTION, SOLUTION INTRAVENOUS at 01:06

## 2017-06-09 RX ADMIN — HYDROMORPHONE HYDROCHLORIDE 1 MG: 1 INJECTION, SOLUTION INTRAMUSCULAR; INTRAVENOUS; SUBCUTANEOUS at 08:06

## 2017-06-09 RX ADMIN — HYDROMORPHONE HYDROCHLORIDE 1 MG: 1 INJECTION, SOLUTION INTRAMUSCULAR; INTRAVENOUS; SUBCUTANEOUS at 06:06

## 2017-06-09 RX ADMIN — LIDOCAINE HYDROCHLORIDE 100 MG: 20 INJECTION, SOLUTION INTRAVENOUS at 12:06

## 2017-06-09 RX ADMIN — ACETAMINOPHEN 650 MG: 325 TABLET ORAL at 03:06

## 2017-06-09 RX ADMIN — ROCURONIUM BROMIDE 10 MG: 10 INJECTION, SOLUTION INTRAVENOUS at 12:06

## 2017-06-09 RX ADMIN — OXYCODONE HYDROCHLORIDE 15 MG: 5 TABLET ORAL at 04:06

## 2017-06-09 RX ADMIN — ENOXAPARIN SODIUM 40 MG: 100 INJECTION SUBCUTANEOUS at 04:06

## 2017-06-09 RX ADMIN — CEFAZOLIN SODIUM 1 G: 1 SOLUTION INTRAVENOUS at 10:06

## 2017-06-09 RX ADMIN — OXYCODONE HYDROCHLORIDE 15 MG: 5 TABLET ORAL at 07:06

## 2017-06-09 RX ADMIN — ROCURONIUM BROMIDE 40 MG: 10 INJECTION, SOLUTION INTRAVENOUS at 12:06

## 2017-06-09 RX ADMIN — ONDANSETRON 4 MG: 2 INJECTION INTRAMUSCULAR; INTRAVENOUS at 01:06

## 2017-06-09 RX ADMIN — NEOSTIGMINE METHYLSULFATE 5 MG: 1 INJECTION INTRAVENOUS at 01:06

## 2017-06-09 RX ADMIN — PROPOFOL 180 MG: 10 INJECTION, EMULSION INTRAVENOUS at 12:06

## 2017-06-09 RX ADMIN — GLYCOPYRROLATE 0.6 MG: 0.2 INJECTION, SOLUTION INTRAMUSCULAR; INTRAVENOUS at 01:06

## 2017-06-09 RX ADMIN — FENTANYL CITRATE 100 MCG: 50 INJECTION, SOLUTION INTRAMUSCULAR; INTRAVENOUS at 12:06

## 2017-06-09 RX ADMIN — OXYCODONE HYDROCHLORIDE 10 MG: 5 TABLET ORAL at 01:06

## 2017-06-09 RX ADMIN — HYDROMORPHONE HYDROCHLORIDE 1 MG: 1 INJECTION, SOLUTION INTRAMUSCULAR; INTRAVENOUS; SUBCUTANEOUS at 10:06

## 2017-06-09 RX ADMIN — Medication 3 ML: at 10:06

## 2017-06-09 RX ADMIN — CEFAZOLIN SODIUM 1 G: 1 SOLUTION INTRAVENOUS at 03:06

## 2017-06-09 NOTE — PLAN OF CARE
Problem: Patient Care Overview  Goal: Plan of Care Review  Outcome: Ongoing (interventions implemented as appropriate)  Pt AA0x3 and VSS. Family at bedside. Two side rails up, bed locked, call light within reach. No falls noted as these precautions remain. Pt free of skin breakdown as the pt moves well independently. Left lower extremity elevated at all times above the heart and ice kept on it at all times. Pain controlled well with PRN meds. Hourly rounds made and no complaints at this time noted. Will resume with plan of care.

## 2017-06-09 NOTE — PLAN OF CARE
Payor: /  Self    Primary Doctor No     No Pharmacies Listed        06/09/17 1058   Discharge Assessment   Assessment Type Discharge Planning Assessment   Confirmed/corrected address and phone number on facesheet? Yes   Assessment information obtained from? Patient   Communicated expected length of stay with patient/caregiver yes   Prior to hospitilization cognitive status: Alert/Oriented   Prior to hospitalization functional status: Independent   Current cognitive status: Alert/Oriented   Current Functional Status: Assistive Equipment;Needs Assistance   Lives With significant other   Able to Return to Prior Arrangements yes   Is patient able to care for self after discharge? Yes  (with assistance)   How many people do you have in your home that can help with your care after discharge? 1   Who are your caregiver(s) and their phone number(s)? Mother Jame Jiménez  [phone]   Patient's perception of discharge disposition home or selfcare   Readmission Within The Last 30 Days no previous admission in last 30 days   Patient currently being followed by outpatient case management? No   Patient currently receives home health services? No   Does the patient currently use HME? No   Patient currently receives private duty nursing? No   Patient currently receives any other outside agency services? No   Equipment Currently Used at Home none   Do you have any problems affording any of your prescribed medications? No   Is the patient taking medications as prescribed? yes   Do you have any financial concerns preventing you from receiving the healthcare you need? Yes  (No insurance)   Does the patient have transportation to healthcare appointments? Yes   Transportation Available family or friend will provide   On Dialysis? No   Does the patient receive services at the [facility]? No   Are there any open cases? No   Discharge Plan A Home;Home Health   Discharge Plan B Home;Home with family   Patient/Family In Agreement With  Plan yes

## 2017-06-09 NOTE — ASSESSMENT & PLAN NOTE
The patient is a 31 y.o. male status post: s/p ex fix left ankle    Plan:  1) Antibiotics: post op  2) Weight bearing status: nwb lle  3) Labs: reviewed  4) DVT Prophylaxis: lovenox  5) Lines/Drains: piv  6) CT LLE  7) PT/OT    Dispo: likely dc today

## 2017-06-09 NOTE — PT/OT/SLP DISCHARGE
Physical Therapy Discharge Summary    Nick Medel  MRN: [id number]   Closed displaced pilon fracture of left tibia   Patient Discharged from acute Physical Therapy on 6/9/17.  Please refer to prior PT noted date on 6/9/17 for functional status.     Assessment:   Patient has met all goals and is not appropriate for therapy.  GOALS:    Physical Therapy Goals     Not on file          Multidisciplinary Problems (Resolved)        Problem: Physical Therapy Goal    Goal Priority Disciplines Outcome Goal Variances Interventions   Physical Therapy Goal   (Resolved)     PT/OT, PT Outcome(s) achieved                   Reasons for Discontinuation of Therapy Services  Satisfactory goal achievement.      Plan:  Patient Discharged to: Home no PT services needed   Hang Sow, PT  .

## 2017-06-09 NOTE — OP NOTE
DATE OF PROCEDURE: 06/09/2017     PREOPERATIVE DIAGNOSIS:   Closed left pilon fracture    POSTOPERATIVE DIAGNOSIS:  Same    PROCEDURE PERFORMED:   1.  Application of ankle spanning external fixator to left lower extremity  2.  Closed reduction of left pilon fracture    SURGEON: Mike Arriaza M.D.    ASSISTANT: Mohit Cisneros M.D..      ANESTHESIA: general    ESTIMATED BLOOD LOSS: 3 mL.    IV FLUIDS: Crystalloid.    IMPLANTS:  Titus Large external fixator.      INDICATIONS FOR PROCEDURE: Patient is a 31 year old male who sustained above injury.  Due to unstable nature of fracture, it is indicated to stabilize the fracture with an external fixator until soft tissues are amenable for definitive surgery.  Risks and benefits of the procedure were performed.  Consents were obtained.    PROCEDURE IN DETAIL:     The patient was identified in the preoperative holding area and the site was marked with the patient's participation.  The patient was taken to the Operating Room and placed supine upon the operating table.  General anesthesia was induced. A non-sterile tourniquet was placed upon the operative thigh. The left lower extremity was prepped and draped in the normal sterile manner.  A time-out was performed to verify the patient's identity, surgery, surgical site, and administration of appropriate IV antibiotics.  All agreed and we proceeded.     A 15 blade was made to make two 1cm incisions just medial to the tibial crest.  Using a drill guide,a 5.0 partially threaded Schanz pin was inserted by hand.  This was repeated for the distal Schanz pin.    Attention was then turned to the calcaneus.   Under fluoroscopic guidance the medial calcaneal pin site was marked approximately  1.5 cm anterior to posterior heel and 1.5 cm proximal to distal heel.  Care was taken to avoid the posterior neurovascular structures.  After checking again under fluoro, a centrally threaded calcaneal pin was driven through the calcaneus.  The position was checked with fluoroscopy.The external fixation construct was built.  Using a combination of traction, rotation, and direct pressure, a closed reduction was performed.  The position was secured.  The reduction of the fracture was verified under fluoro and found to be adequate in regards to length, alignment, and rotation.    Pin sites were dressed with Hibaclens-soaked sponges and wrapped with Kerlex.      The patient was extubated, awakened and taken to the Recovery Room in stable condition.    PLAN FOR THE PATIENT:  Patient will remain non weight-bearing until soft tissues are amenable for definitive fixation.

## 2017-06-09 NOTE — ANESTHESIA POSTPROCEDURE EVALUATION
"Anesthesia Post Evaluation    Patient: Nick Medel    Procedure(s) Performed: Procedure(s) (LRB):  APPLICATION-EXTERNAL FIXATION DEVICE - left ankle spanning (Left)    Final Anesthesia Type: general  Patient location during evaluation: PACU  Patient participation: Yes- Able to Participate  Level of consciousness: awake and alert  Post-procedure vital signs: reviewed and stable  Pain management: adequate  Airway patency: patent  PONV status at discharge: No PONV  Anesthetic complications: no      Cardiovascular status: blood pressure returned to baseline  Respiratory status: unassisted  Hydration status: euvolemic  Follow-up not needed.        Visit Vitals  BP (!) 149/86   Pulse (!) 56   Temp 37.1 °C (98.8 °F) (Oral)   Resp 18   Ht 5' 10" (1.778 m)   Wt 81.6 kg (180 lb)   SpO2 97%   BMI 25.83 kg/m²       Pain/Misa Score: Pain Assessment Performed: Yes (6/8/2017 10:00 PM)  Presence of Pain: complains of pain/discomfort (6/8/2017  8:00 PM)  Pain Rating Prior to Med Admin: 4 (6/9/2017  1:51 AM)      "

## 2017-06-09 NOTE — PT/OT/SLP EVAL
Occupational Therapy  Evaluation    Nick Medel   MRN: [id number]   Admitting Diagnosis: Closed displaced pilon fracture of left tibia    OT Date of Treatment: 06/09/17   OT Start Time: 1130  OT Stop Time: 1148  OT Total Time (min): 18 min    Billable Minutes:  Evaluation 18    Diagnosis: Closed displaced pilon fracture of left tibia   S/p ex-fix application    History reviewed. No pertinent past medical history.   Past Surgical History:   Procedure Laterality Date    leg       fractured    LEG SURGERY Left        General Precautions: Standard, fall  Orthopedic Precautions: LLE non weight bearing    Patient History:  Living Environment  Lives With: significant other  Living Arrangements: house  Home Accessibility: stairs to enter home  Home Layout: Able to live on 1st floor  Number of Stairs to Enter Home: 4  Stair Railings at Home: outside, present at both sides  Transportation Available: family or friend will provide  Living Environment Comment: 24/7 Spv/(A) available  Equipment Currently Used at Home: none    Prior level of function:   Bed Mobility/Transfers: independent  Grooming: independent  Bathing: independent  Upper Body Dressing: independent  Lower Body Dressing: independent  Toileting: independent  Home Management Skills: independent    Subjective:  Communicated with RN prior to session.    Pt agreeable to Evaluation    Pain/Comfort  Pain Rating 1: 3/10  Location - Side 1: Left  Location 1: leg  Pain Addressed 1: Pre-medicate for activity, Reposition, Distraction    Objective:  Upper Extremity Range of Motion:  Right Upper Extremity: WNL  Left Upper Extremity: WNL    Upper Extremity Strength:  Right Upper Extremity: WNL  Left Upper Extremity: WNL    Functional Mobility:  Bed Mobility:  Scooting/Bridging: Modified Independent  Supine to Sit: Modified Independent  Sit to Supine: Modified Independent    Transfers:  Sit <> Stand Assistance: Modified Independent  Sit <> Stand Assistive Device: No  "Assistive Device    Functional Ambulation: Mod (I) with axillary crutches 200 feet    Activities of Daily Living:  UE Dressing Level of Assistance: Modified independent    LE Dressing Level of Assistance: Minimum assistance (to get underwear over ex-fix to thread L LE)    AM-[facility] 6 CLICK ADL  How much help from another person does this patient currently need?  1 = Unable, Total/Dependent Assistance  2 = A lot, Maximum/Moderate Assistance  3 = A little, Minimum/Contact Guard/Supervision  4 = None, Modified [place]/Independent    Putting on and taking off regular lower body clothing? : 3  Bathing (including washing, rinsing, drying)?: 3  Toileting, which includes using toilet, bedpan, or urinal? : 4  Putting on and taking off regular upper body clothing?: 4  Taking care of personal grooming such as brushing teeth?: 4  Eating meals?: 4  Total Score: 22    AM-PAC Raw Score CMS "G-Code Modifier Level of Impairment Assistance   6 % Total / Unable   7 - 9 CM 80 - 100% Maximal Assist   10-14 CL 60 - 80% Moderate Assist   15 - 19 CK 40 - 60% Moderate Assist   20 - 22 CJ 20 - 40% Minimal Assist   23 CI 1-20% SBA / CGA   24 CH 0% Independent/ Mod I       Patient left supine with all lines intact, call button in reach and RN notified    Assessment:  Nick Medel is a 31 y.o. male with a medical diagnosis of Closed displaced pilon fracture of left tibia and is performing ADLs and functional mobility with Mod(I) and is not a candidate for skilled OT services at this time.    Rehab identified problem list/impairments: Rehab identified problem list/impairments: gait instability, decreased safety awareness, pain, orthopedic precautions, decreased lower extremity function    Discharge recommendations: Discharge Facility/Level Of Care Needs: home     Barriers to discharge: Barriers to Discharge: None    Equipment recommendations: crutches, axillary     GOALS:    Occupational Therapy Goals     N/A                PLAN:  "   D/C acute OT  Plan of Care reviewed with: patient    RUPA Valentin  06/09/2017

## 2017-06-09 NOTE — PROGRESS NOTES
"[facility]  Orthopedics  Progress Note    Patient Name: Nick Medel  MRN: [id number]  Admission Date: 6/8/2017  Hospital Length of Stay: 1 days  Attending Provider: No att. providers found  Primary Care Provider: Primary Doctor No  Follow-up For: Procedure(s) (LRB):  APPLICATION-EXTERNAL FIXATION DEVICE - left ankle spanning (Left)    Post-Operative Day: 1 Day Post-Op  Subjective:     Principal Problem:Closed displaced pilon fracture of left tibia    Principal Orthopedic Problem: same    Interval History: The patient was seen and examined this morning at the bedside. Patient reports no acute issues overnight.  Pain controlled.  Have kept LLE elevated with ice overnight     Review of patient's allergies indicates:  No Known Allergies    Current Facility-Administered Medications   Medication    0.9%  NaCl infusion    0.9%  NaCl infusion    acetaminophen tablet 650 mg    ceFAZolin (ANCEF) 1 gram in dextrose 5 % 50 mL IVPB (premix)    diphenhydrAMINE injection 25 mg    enoxaparin injection 40 mg    HYDROmorphone injection 0.5 mg    HYDROmorphone injection 1 mg    ondansetron disintegrating tablet 8 mg    oxycodone immediate release tablet 10 mg    oxycodone immediate release tablet 15 mg    oxycodone immediate release tablet 5 mg    promethazine (PHENERGAN) 6.25 mg in dextrose 5 % 50 mL IVPB    sodium chloride 0.9% flush 3 mL    sodium chloride 0.9% flush 3 mL     Objective:     Vital Signs (Most Recent):  Temp: 99.1 °F (37.3 °C) (06/09/17 0715)  Pulse: 64 (06/09/17 0715)  Resp: 16 (06/09/17 0715)  BP: 136/79 (06/09/17 0715)  SpO2: 97 % (06/09/17 0715) Vital Signs (24h Range):  Temp:  [97.7 °F (36.5 °C)-99.5 °F (37.5 °C)] 99.1 °F (37.3 °C)  Pulse:  [55-84] 64  Resp:  [14-18] 16  SpO2:  [95 %-100 %] 97 %  BP: (122-166)/(64-95) 136/79     Weight: 81.6 kg (180 lb)  Height: 5' 10" (177.8 cm)  Body mass index is 25.83 kg/m².      Intake/Output Summary (Last 24 hours) at 06/09/17 " 0802  Last data filed at 06/09/17 0600   Gross per 24 hour   Intake                0 ml   Output              550 ml   Net             -550 ml       Ortho/SPM Exam    Awake/alert/oriented x3, No acute distress, Afebrile, Vital signs stable  Good inspiratory effort with unlaboured breathing  LLE: swollen, compartments soft/compressible  NVI in operative limb  Pin sites c/d/i        Assessment/Plan:     * Closed displaced pilon fracture of left tibia    The patient is a 31 y.o. male status post: s/p ex fix left ankle    Plan:  1) Antibiotics: post op  2) Weight bearing status: nwb lle  3) Labs: reviewed  4) DVT Prophylaxis: lovenox  5) Lines/Drains: piv  6) CT LLE  7) PT/OT    Dispo: likely dc today                Mohit Cisneros MD  Orthopedics  [facility]

## 2017-06-09 NOTE — PLAN OF CARE
Problem: Physical Therapy Goal  Goal: Physical Therapy Goal  Outcome: Outcome(s) achieved Date Met: 06/09/17  Pt tolerated evaluation well with no complications. Pt ambulated 120' x2 trials using crutches with no LOB. Pt climbed up/down 4 stair steps with no LOB and supervision. Pt safe to d/c home at this time.

## 2017-06-09 NOTE — PT/OT/SLP EVAL
Physical Therapy  Evaluation/Discharge    Nick Medel   MRN: [id number]   Admitting Diagnosis: Closed displaced pilon fracture of left tibia    PT Received On: 06/09/17  PT Start Time: 1130     PT Stop Time: 1148    PT Total Time (min): 18 min       Billable Minutes:  Evaluation 10 and Gait Training8    Diagnosis: Closed displaced pilon fracture of left tibia      History reviewed. No pertinent past medical history.   Past Surgical History:   Procedure Laterality Date    leg       fractured    LEG SURGERY Left        Referring physician: Blayne  Date referred to PT: 6/9/2017      General Precautions: Standard, fall  Orthopedic Precautions: LLE non weight bearing   Braces:  (L ex-fix)       Do you have any cultural, spiritual, Spiritism conflicts, given your current situation?: none stated    Patient History:  Lives With: significant other  Living Arrangements: house  Home Accessibility: stairs to enter home  Home Layout: Able to live on 1st floor  Number of Stairs to Enter Home: 4  Stair Railings at Home: outside, present at both sides  Transportation Available: family or friend will provide  Living Environment Comment: Pt lives with girlfriend in a [facility] with 4 MYRNA and (B) rails. Pt owns no DME but has a walk-in shower with a seat.   Equipment Currently Used at Home: none  DME owned (not currently used):     Previous Level of Function:  Ambulation Skills: independent  Transfer Skills: independent  ADL Skills: independent  Work/Leisure Activity: independent    Subjective:  Communicated with nsg prior to session.  -Pt agreeable to PT session    Chief Complaint: impaired functional mobility and (L) LE weakness  Patient goals: return home to PLOF    Pain/Comfort  Pain Rating 1: 3/10  Location - Side 1: Left  Location - Orientation 1: generalized  Location 1: leg  Pain Addressed 1: Pre-medicate for activity  Pain Rating Post-Intervention 1: 3/10      Objective:   Patient found with:  (no lines)     Cognitive  Exam:  Oriented to: Person, Place, Time and Situation    Follows Commands/attention: Follows multistep  commands  Communication: clear/fluent  Safety awareness/insight to disability: intact    Physical Exam:  Postural examination/scapula alignment: No postural abnormalities identified    Skin integrity: Visible skin intact  Edema: Mild (L) LE    Sensation:   Intact  light/touch (B) LE    Lower Extremity Range of Motion:  Right Lower Extremity: WFL  Left Lower Extremity: WFL except ankle in ex-fix    Lower Extremity Strength:  Right Lower Extremity: WFL  Left Lower Extremity: Deficits: CARLOS      Functional Mobility:  Bed Mobility:  Scooting/Bridging: Modified Independent  Supine to Sit: Modified Independent  Sit to Supine: Modified Independent    Transfers:  Sit <> Stand Assistance: Supervision  Sit <> Stand Assistive Device: Axillary crutches  Bed <> Chair Technique: Stand Pivot  Bed <> Chair Assistance: Supervision  Bed <> Chair Assistive Device: Axillary Crutches    Gait:   Gait Distance: 120' x2 trials  Assistance 1: Supervision  Gait Assistive Device: Axillary crutches  Gait Pattern: 2-point gait  Gait Deviation(s):  (no deviations)    Stairs:  Pt ascended/descend 4 stair(s) with No Assistive Device with bilateral and handrails with Supervision or Set-up Assistance.     Balance:   Static Sit: GOOD: Takes MODERATE challenges from all directions  Dynamic Sit: GOOD: Maintains balance through MODERATE excursions of active trunk movement  Static Stand: GOOD: Takes MODERATE challenges from all directions  Dynamic stand: GOOD-: Needs SUPERVISION only during gait and able to self right with moderate     Therapeutic Activities and Exercises:  -Pt educated on:  A. PT POC and role of PT  B. Importance of OOB activity to improve functional outcomes after surgery  C. DME mgmt and gait/transfer sequencing  D. Performing HEP to reduce risk of developing blood clots  E. Car t/f      AM-PAC 6 CLICK MOBILITY  How much help from  another person does this patient currently need?   1 = Unable, Total/Dependent Assistance  2 = A lot, Maximum/Moderate Assistance  3 = A little, Minimum/Contact Guard/Supervision  4 = None, Modified [place]/Independent    Turning over in bed (including adjusting bedclothes, sheets and blankets)?: 4  Sitting down on and standing up from a chair with arms (e.g., wheelchair, bedside commode, etc.): 4  Moving from lying on back to sitting on the side of the bed?: 4  Moving to and from a bed to a chair (including a wheelchair)?: 4  Need to walk in hospital room?: 4  Climbing 3-5 steps with a railing?: 4  Total Score: 24     AM-PAC Raw Score CMS G-Code Modifier Level of Impairment Assistance   6 % Total / Unable   7 - 9 CM 80 - 100% Maximal Assist   10 - 14 CL 60 - 80% Moderate Assist   15 - 19 CK 40 - 60% Moderate Assist   20 - 22 CJ 20 - 40% Minimal Assist   23 CI 1-20% SBA / CGA   24 CH 0% Independent/ Mod I     Patient left supine with all lines intact, call button in reach and nsg notified.    Assessment:   Nick Medel is a 31 y.o. male with a medical diagnosis of Closed displaced pilon fracture of left tibia and presents with impaired functional mobility. Pt tolerated evaluation well with no complications. Pt ambulated 120' x2 trials using crutches with no LOB. Pt climbed up/down 4 stair steps with no LOB and supervision. Pt safe to d/c home at this time.    Rehab identified problem list/impairments: Rehab identified problem list/impairments: weakness, impaired sensation, impaired balance, gait instability, pain, decreased safety awareness, orthopedic precautions    Rehab potential is good.    Activity tolerance: Good    Discharge recommendations: Discharge Facility/Level Of Care Needs: home     Barriers to discharge: Barriers to Discharge: None    Equipment recommendations: Equipment Needed After Discharge: crutches, axillary     GOALS:    Physical Therapy Goals     Not on file           Multidisciplinary Problems (Resolved)        Problem: Physical Therapy Goal    Goal Priority Disciplines Outcome Goal Variances Interventions   Physical Therapy Goal   (Resolved)     PT/OT, PT Outcome(s) achieved                     PLAN:  Pt to be d/c'd from therapy services at this time          Hang Sow, PT  06/09/2017

## 2017-06-09 NOTE — PLAN OF CARE
[facility]    HOME HEALTH ORDERS  FACE TO FACE ENCOUNTER    Patient Name: Nick Medel  YOB: 1985    PCP: Primary Doctor No   PCP Address: None  PCP Phone Number: None  PCP Fax: None    Encounter Date: 06/09/2017    Admit to Home Health    Diagnoses:  Active Hospital Problems    Diagnosis  POA    *Closed displaced pilon fracture of left tibia [S82.872A]  Yes      Resolved Hospital Problems    Diagnosis Date Resolved POA   No resolved problems to display.       No future appointments.        I have seen and examined this patient face to face today. My clinical findings that support the need for the home health skilled services and home bound status are the following:  Weakness/numbness causing balance and gait disturbance due to Fracture making it taxing to leave home.    Allergies:Review of patient's allergies indicates:  No Known Allergies    Diet: regular diet    Activities: nwb lle    Nursing:   SN to complete comprehensive assessment including routine vital signs. Instruct on disease process and s/s of complications to report to MD. Review/verify medication list sent home with the patient at time of discharge  and instruct patient/caregiver as needed. Frequency may be adjusted depending on start of care date.    Notify MD if SBP > 160 or < 90; DBP > 90 or < 50; HR > 120 or < 50; Temp > 101;       CONSULTS:    Physical Therapy to evaluate and treat. Evaluate for home safety and equipment needs; Establish/upgrade home exercise program. Perform / instruct on therapeutic exercises, gait training, transfer training, and Range of Motion.  Occupational Therapy to evaluate and treat. Evaluate home environment for safety and equipment needs. Perform/Instruct on transfers, ADL training, ROM, and therapeutic exercises.  Aide to provide assistance with personal care, ADLs, and vital signs.    MISCELLANEOUS CARE:      WOUND CARE ORDERS  Twice daily pin site care:  Clean pins with 50%  peroxide / 50% sterile water solution.  Wrap pins with Kerlex gauze.         Medications: Review discharge medications with patient and family and provide education.      Current Discharge Medication List      START taking these medications    Details   docusate sodium (COLACE) 100 MG capsule Take 1 capsule (100 mg total) by mouth 2 (two) times daily.  Qty: 60 capsule, Refills: 0      enoxaparin (LOVENOX) 40 mg/0.4 mL Syrg Inject 0.4 mLs (40 mg total) into the skin once daily.  Qty: 12 mL, Refills: 0      ondansetron (ZOFRAN-ODT) 8 MG TbDL Take 1 tablet (8 mg total) by mouth every 6 (six) hours as needed.  Qty: 60 tablet, Refills: 0      oxycodone-acetaminophen (PERCOCET)  mg per tablet Take 1 tablet by mouth every 4 (four) hours as needed for Pain.  Qty: 90 tablet, Refills: 0         CONTINUE these medications which have NOT CHANGED    Details   hydrocodone-acetaminophen 10-325mg (NORCO)  mg Tab Take 1 tablet by mouth every 4 (four) hours as needed for Pain.  Qty: 18 tablet, Refills: 0             I certify that this patient is confined to his home and needs intermittent skilled nursing care, physical therapy and occupational therapy.

## 2017-06-09 NOTE — ANESTHESIA RELEASE NOTE
"Anesthesia Release from PACU Note    Patient: Nick Medel    Procedure(s) Performed: Procedure(s) (LRB):  APPLICATION-EXTERNAL FIXATION DEVICE - left ankle spanning (Left)    Anesthesia type: general    Post pain: Adequate analgesia    Post assessment: no apparent anesthetic complications, tolerated procedure well and no evidence of recall    Last Vitals:   Visit Vitals  BP (!) 149/86   Pulse (!) 56   Temp 37.1 °C (98.8 °F) (Oral)   Resp 18   Ht 5' 10" (1.778 m)   Wt 81.6 kg (180 lb)   SpO2 97%   BMI 25.83 kg/m²       Post vital signs: stable    Level of consciousness: awake, alert  and oriented    Nausea/Vomiting: no nausea/no vomiting    Complications: none    Airway Patency: patent    Respiratory: unassisted    Cardiovascular: stable and blood pressure at baseline    Hydration: euvolemic  "

## 2017-06-09 NOTE — ADDENDUM NOTE
Addendum  created 06/09/17 0609 by Kevin Irby MD    Anesthesia Event deleted, Anesthesia Event edited

## 2017-06-10 VITALS
HEART RATE: 98 BPM | HEIGHT: 70 IN | SYSTOLIC BLOOD PRESSURE: 134 MMHG | TEMPERATURE: 98 F | WEIGHT: 180 LBS | DIASTOLIC BLOOD PRESSURE: 78 MMHG | OXYGEN SATURATION: 98 % | BODY MASS INDEX: 25.77 KG/M2 | RESPIRATION RATE: 19 BRPM

## 2017-06-10 PROCEDURE — 63600175 PHARM REV CODE 636 W HCPCS: Performed by: STUDENT IN AN ORGANIZED HEALTH CARE EDUCATION/TRAINING PROGRAM

## 2017-06-10 PROCEDURE — 25000003 PHARM REV CODE 250: Performed by: STUDENT IN AN ORGANIZED HEALTH CARE EDUCATION/TRAINING PROGRAM

## 2017-06-10 PROCEDURE — 63600175 PHARM REV CODE 636 W HCPCS: Performed by: ORTHOPAEDIC SURGERY

## 2017-06-10 PROCEDURE — G0378 HOSPITAL OBSERVATION PER HR: HCPCS

## 2017-06-10 RX ADMIN — HYDROMORPHONE HYDROCHLORIDE 1 MG: 1 INJECTION, SOLUTION INTRAMUSCULAR; INTRAVENOUS; SUBCUTANEOUS at 03:06

## 2017-06-10 RX ADMIN — Medication 3 ML: at 06:06

## 2017-06-10 RX ADMIN — OXYCODONE HYDROCHLORIDE 15 MG: 5 TABLET ORAL at 06:06

## 2017-06-10 RX ADMIN — HYDROMORPHONE HYDROCHLORIDE 1 MG: 1 INJECTION, SOLUTION INTRAMUSCULAR; INTRAVENOUS; SUBCUTANEOUS at 12:06

## 2017-06-10 RX ADMIN — Medication 3 ML: at 02:06

## 2017-06-10 RX ADMIN — OXYCODONE HYDROCHLORIDE 15 MG: 5 TABLET ORAL at 02:06

## 2017-06-10 RX ADMIN — HYDROMORPHONE HYDROCHLORIDE 1 MG: 1 INJECTION, SOLUTION INTRAMUSCULAR; INTRAVENOUS; SUBCUTANEOUS at 04:06

## 2017-06-10 RX ADMIN — HYDROMORPHONE HYDROCHLORIDE 0.5 MG: 1 INJECTION, SOLUTION INTRAMUSCULAR; INTRAVENOUS; SUBCUTANEOUS at 12:06

## 2017-06-10 RX ADMIN — HYDROMORPHONE HYDROCHLORIDE 1 MG: 1 INJECTION, SOLUTION INTRAMUSCULAR; INTRAVENOUS; SUBCUTANEOUS at 09:06

## 2017-06-10 NOTE — PROGRESS NOTES
"[facility]  Orthopedics  Progress Note    Patient Name: Nick Medel  MRN: [id number]  Admission Date: 6/8/2017  Hospital Length of Stay: 1 days  Attending Provider: Mike Arriaza MD  Primary Care Provider: Primary Doctor No  Follow-up For: Procedure(s) (LRB):  APPLICATION-EXTERNAL FIXATION DEVICE - left ankle spanning (Left)    Post-Operative Day: 2 Days Post-Op  Subjective:     Principal Problem:Closed displaced pilon fracture of left tibia    Principal Orthopedic Problem: same    Interval History: NAEON.  Pain controlled this morning.  Ready to go home whenever cleared.    Review of patient's allergies indicates:  No Known Allergies    Current Facility-Administered Medications   Medication    0.9%  NaCl infusion    0.9%  NaCl infusion    acetaminophen tablet 650 mg    diphenhydrAMINE injection 25 mg    enoxaparin injection 40 mg    HYDROmorphone injection 0.5 mg    HYDROmorphone injection 1 mg    ondansetron disintegrating tablet 8 mg    oxycodone immediate release tablet 10 mg    oxycodone immediate release tablet 15 mg    oxycodone immediate release tablet 5 mg    promethazine (PHENERGAN) 6.25 mg in dextrose 5 % 50 mL IVPB    sodium chloride 0.9% flush 3 mL    sodium chloride 0.9% flush 3 mL     Objective:     Vital Signs (Most Recent):  Temp: 99.4 °F (37.4 °C) (06/10/17 0400)  Pulse: 99 (06/10/17 0400)  Resp: 20 (06/10/17 0400)  BP: (!) 141/85 (06/10/17 0400)  SpO2: 96 % (06/10/17 0400) Vital Signs (24h Range):  Temp:  [98.4 °F (36.9 °C)-100.1 °F (37.8 °C)] 99.4 °F (37.4 °C)  Pulse:  [61-99] 99  Resp:  [16-20] 20  SpO2:  [96 %-99 %] 96 %  BP: (140-144)/(80-87) 141/85     Weight: 81.6 kg (180 lb)  Height: 5' 10" (177.8 cm)  Body mass index is 25.83 kg/m².      Intake/Output Summary (Last 24 hours) at 06/10/17 0717  Last data filed at 06/09/17 1329   Gross per 24 hour   Intake              770 ml   Output              675 ml   Net               95 ml       Ortho/SPM Exam "   Physical Exam:  NAD, A/O x 3.  Ex fix in place; pin sites c/d/i.  Blistering around fracture site; serous intact  No focal motor or sensory deficits noted.  Drain: none      Significant Labs:   BMP:   Recent Labs  Lab 06/08/17  1539         K 3.7      CO2 24   BUN 10   CREATININE 1.0   CALCIUM 9.3     CBC:   Recent Labs  Lab 06/08/17  1539   WBC 21.29*   HGB 14.1   HCT 42.8        All pertinent labs within the past 24 hours have been reviewed.    Significant Imaging: None    Assessment/Plan:     * Closed displaced pilon fracture of left tibia    The patient is a 31 y.o. male status post: s/p ex fix left ankle    Plan:  1) Antibiotics: post op  2) Weight bearing status: nwb lle  3) Labs: reviewed  4) DVT Prophylaxis: lovenox  5) Lines/Drains: piv  6) CT LLE  7) PT/OT    Dispo: likely dc today                Arley Flores MD  Orthopedics  [facility]

## 2017-06-10 NOTE — PLAN OF CARE
Problem: Patient Care Overview  Goal: Plan of Care Review  Pt verbalized understanding plan of care. Frequent assessments done and fall precautions maintained. Pain and discomfort controlled. Will continue to monitor.

## 2017-06-10 NOTE — SUBJECTIVE & OBJECTIVE
"Principal Problem:Closed displaced pilon fracture of left tibia    Principal Orthopedic Problem: same    Interval History: NAEON.  Pain controlled this morning.  Ready to go home whenever cleared.    Review of patient's allergies indicates:  No Known Allergies    Current Facility-Administered Medications   Medication    0.9%  NaCl infusion    0.9%  NaCl infusion    acetaminophen tablet 650 mg    diphenhydrAMINE injection 25 mg    enoxaparin injection 40 mg    HYDROmorphone injection 0.5 mg    HYDROmorphone injection 1 mg    ondansetron disintegrating tablet 8 mg    oxycodone immediate release tablet 10 mg    oxycodone immediate release tablet 15 mg    oxycodone immediate release tablet 5 mg    promethazine (PHENERGAN) 6.25 mg in dextrose 5 % 50 mL IVPB    sodium chloride 0.9% flush 3 mL    sodium chloride 0.9% flush 3 mL     Objective:     Vital Signs (Most Recent):  Temp: 99.4 °F (37.4 °C) (06/10/17 0400)  Pulse: 99 (06/10/17 0400)  Resp: 20 (06/10/17 0400)  BP: (!) 141/85 (06/10/17 0400)  SpO2: 96 % (06/10/17 0400) Vital Signs (24h Range):  Temp:  [98.4 °F (36.9 °C)-100.1 °F (37.8 °C)] 99.4 °F (37.4 °C)  Pulse:  [61-99] 99  Resp:  [16-20] 20  SpO2:  [96 %-99 %] 96 %  BP: (140-144)/(80-87) 141/85     Weight: 81.6 kg (180 lb)  Height: 5' 10" (177.8 cm)  Body mass index is 25.83 kg/m².      Intake/Output Summary (Last 24 hours) at 06/10/17 0717  Last data filed at 06/09/17 1329   Gross per 24 hour   Intake              770 ml   Output              675 ml   Net               95 ml       Ortho/SPM Exam   Physical Exam:  NAD, A/O x 3.  Ex fix in place; pin sites c/d/i.  Blistering around fracture site; serous intact  No focal motor or sensory deficits noted.  Drain: none      Significant Labs:   BMP:   Recent Labs  Lab 06/08/17  1539         K 3.7      CO2 24   BUN 10   CREATININE 1.0   CALCIUM 9.3     CBC:   Recent Labs  Lab 06/08/17  1539   WBC 21.29*   HGB 14.1   HCT 42.8    "     All pertinent labs within the past 24 hours have been reviewed.    Significant Imaging: None

## 2017-06-10 NOTE — PLAN OF CARE
Problem: Patient Care Overview  Goal: Plan of Care Review  Outcome: Ongoing (interventions implemented as appropriate)  Patient progressing with POC. Pain controlled with PRN medication. Vital signs stable. Afebrile. Safety and fall precautions active. SCDs, polar ice and ice pack in place. Call light in reach. Will continue to monitor per frequent rounding.

## 2017-06-10 NOTE — PLAN OF CARE
Pt to d/c today with crutches. Pt has no insurance on file. SW met with pt at bedside.    Pt reports Workmans Comp coverage w/ Brandyn Vasques of [employer] - [phone].    Pt crutches order faxed to [facility] for bedside delivery. Workmans Comp info provided on fax cover sheet.    Sarah Stringer LMSW, CCM

## 2017-06-11 ENCOUNTER — NURSE TRIAGE (OUTPATIENT)
Dept: ADMINISTRATIVE | Facility: CLINIC | Age: 32
End: 2017-06-11

## 2017-06-11 NOTE — TELEPHONE ENCOUNTER
Reason for Disposition   [1] Caller requesting NON-URGENT health information AND [2] PCP's office is the best resource    Protocols used: ST INFORMATION ONLY CALL-A-AH    Caregiver calling wanting to know how to clean the fixation device.  There is a sponge on one side and not sure if she can take it off.  Will message MD and Staff.  Please Call Caregiver (Morelia) at [phone].

## 2017-06-11 NOTE — DISCHARGE SUMMARY
[facility]  Orthopedics  Discharge Summary      Patient Name: Nick Medel  MRN: [id number]  Admission Date: 6/8/2017  Hospital Length of Stay: 1 days  Discharge Date and Time: 6/10/2017  3:53 PM  Attending Physician: No att. providers found   Discharging Provider: Arley Flores MD  Primary Care Provider: Primary Doctor No    HPI: Pt admitted with tib frib fracture    Procedure(s) (LRB):  APPLICATION-EXTERNAL FIXATION DEVICE - left ankle spanning (Left)      Hospital Course: Pt was admitted s/p ex-fix  for monitoring and pain control. On POD3  pt was tolerating regular diet, pain was controlled on PO pain medication, pt had worked with and was cleared by PT,  and pt was dicharged with f/u in 2 weeks.      Consults         Status Ordering Provider     Inpatient consult to Orthopedic Surgery  Once     Provider:  (Not yet assigned)    Completed CARLOS ALEJANDRA     Inpatient consult to Orthopedic Surgery  Once     Provider:  (Not yet assigned)    Completed NAZ LEWIS          Significant Diagnostic Studies: Labs: BMP: No results for input(s): GLU, NA, K, CL, CO2, BUN, CREATININE, CALCIUM, MG in the last 48 hours. and CBC No results for input(s): WBC, HGB, HCT, PLT in the last 48 hours.    Pending Diagnostic Studies:     Procedure Component Value Units Date/Time    SURG FL Surgery Fluoro Less Than 1 Hour [[id number]] Resulted:  06/09/17 0205    Order Status:  Sent Lab Status:  In process Updated:  06/09/17 0206        Final Active Diagnoses:    Diagnosis Date Noted POA    PRINCIPAL PROBLEM:  Closed displaced pilon fracture of left tibia [S82.872A] 06/08/2017 Yes      Problems Resolved During this Admission:    Diagnosis Date Noted Date Resolved POA      Discharged Condition: good    Disposition: Home or Self Care    Follow Up:  Follow-up Information     Brandyn Dhillon MD. Schedule an appointment as soon as possible for a visit in 1 week.    Specialty:  Orthopedic  "Surgery  Contact information:  [address]  [facility] [address]  [phone]                 Patient Instructions:     CRUTCHES FOR HOME USE   Order Specific Question Answer Comments   Type: Axillary    Height: 5' 10" (1.778 m)    Weight: 81.6 kg (180 lb)    Length of need (1-99 months): 99      TRANSFER TUB BENCH FOR HOME USE   Order Specific Question Answer Comments   Type of Transfer Tub Bench: Padded    Height: 5' 10" (1.778 m)    Weight: 81.6 kg (180 lb)    Length of need (1-99 months): 99      WALKER FOR HOME USE   Order Specific Question Answer Comments   Type of Walker: Adult (5'4"-6'6")    With wheels? Yes    Height: 5' 10" (1.778 m)    Weight: 81.6 kg (180 lb)    Length of need (1-99 months): 99    Please check all that apply: Patient's condition impairs ambulation.      COMMODE FOR HOME USE   Order Specific Question Answer Comments   Type: Standard    Height: 5' 10" (1.778 m)    Weight: 81.6 kg (180 lb)    Length of need (1-99 months): 99      Diet general     Activity as tolerated     Sponge bath only until clinic visit     Keep surgical extremity elevated     Ice to affected area     Call MD for:  temperature >100.4     Call MD for:  persistent nausea and vomiting or diarrhea     Call MD for:  severe uncontrolled pain     Call MD for:  redness, tenderness, or signs of infection (pain, swelling, redness, odor or green/yellow discharge around incision site)     Call MD for:  difficulty breathing or increased cough     Call MD for:  severe persistent headache     Call MD for:  worsening rash     Call MD for:  persistent dizziness, light-headedness, or visual disturbances     Leave dressing on - Keep it clean, dry, and intact until clinic visit       Medications:  Reconciled Home Medications:   Discharge Medication List as of 6/10/2017 12:23 PM      START taking these medications    Details   docusate sodium (COLACE) 100 MG capsule Take 1 capsule (100 mg total) by mouth 2 (two) times daily., " Starting Fri 6/9/2017, Until Fri 7/7/2017, Print      enoxaparin (LOVENOX) 40 mg/0.4 mL Syrg Inject 0.4 mLs (40 mg total) into the skin once daily., Starting Fri 6/9/2017, Until Sun 7/9/2017, Print      ondansetron (ZOFRAN-ODT) 8 MG TbDL Take 1 tablet (8 mg total) by mouth every 6 (six) hours as needed., Starting Fri 6/9/2017, Print      oxycodone-acetaminophen (PERCOCET)  mg per tablet Take 1 tablet by mouth every 4 (four) hours as needed for Pain., Starting Fri 6/9/2017, Print         CONTINUE these medications which have NOT CHANGED    Details   hydrocodone-acetaminophen 10-325mg (NORCO)  mg Tab Take 1 tablet by mouth every 4 (four) hours as needed for Pain., Starting Thu 6/8/2017, Print             Arley Flores MD  Orthopedics  [facility]

## 2017-06-12 ENCOUNTER — TELEPHONE (OUTPATIENT)
Dept: ORTHOPEDICS | Facility: CLINIC | Age: 32
End: 2017-06-12

## 2017-06-12 NOTE — TELEPHONE ENCOUNTER
----- Message from Norberto Haque MD sent at 6/9/2017 10:32 PM CDT -----  Please schedule patient for skin check next Tuesday.  Needs to be consented and scheduled for MAGALI fuller

## 2017-06-12 NOTE — TELEPHONE ENCOUNTER
Spoke with pt.   Provided instructions for pin site care.   Scheduled pt for clinic visit tomorrow with Dr Dhillon

## 2017-06-13 ENCOUNTER — OFFICE VISIT (OUTPATIENT)
Dept: ORTHOPEDICS | Facility: CLINIC | Age: 32
End: 2017-06-13
Payer: OTHER MISCELLANEOUS

## 2017-06-13 VITALS — WEIGHT: 179.88 LBS | BODY MASS INDEX: 25.75 KG/M2 | HEIGHT: 70 IN

## 2017-06-13 DIAGNOSIS — S82.872A CLOSED DISPLACED PILON FRACTURE OF LEFT TIBIA, INITIAL ENCOUNTER: Primary | ICD-10-CM

## 2017-06-13 PROCEDURE — 99024 POSTOP FOLLOW-UP VISIT: CPT | Mod: S$GLB,,, | Performed by: ORTHOPAEDIC SURGERY

## 2017-06-13 PROCEDURE — 99999 PR PBB SHADOW E&M-EST. PATIENT-LVL II: CPT | Mod: PBBFAC,,, | Performed by: ORTHOPAEDIC SURGERY

## 2017-06-13 NOTE — PROGRESS NOTES
CC: left leg pain    HPI: Patient is a 31 y.o. male with h/o left gavin fx s/p ORIF (~2007 [place]) who sustained a closed left tibial plafond fracture on 6/8/17 after a boom hit is ankle at work. He was taken to [facility] where he was placed in a splint and directed to follow up with orthopedic surgeon.  he then elected to [place] [facility] [place]      He was placed in a spanning external fixator on 6/9/17.  he presents today for follow-up and to discuss definitive fixation.    Review of Systems   Constitution: Negative. Negative for chills, fever and night sweats.   HENT: neg congestion.    Eyes: Negative for blurred vision.  Cardiovascular: Negative for chest pain and syncope.   Respiratory: Negative for cough and shortness of breath.    Hematologic/Lymphatic: Does not bruise/bleed easily.   Skin: Negative for dry skin, itching and rash.   Musculoskeletal: +fall.  No weakness  Gastrointestinal: Negative for abdominal pain.  Normal bowel function.  Genitourinary: Normal bladder function   Neurological: No dizziness, loss of balance, seizures.   Psychiatric/Behavioral: Negative for depression.        No past medical history on file.  Past Surgical History:   Procedure Laterality Date    leg       fractured    LEG SURGERY Left      Current Outpatient Prescriptions on File Prior to Visit   Medication Sig Dispense Refill    docusate sodium (COLACE) 100 MG capsule Take 1 capsule (100 mg total) by mouth 2 (two) times daily. 60 capsule 0    enoxaparin (LOVENOX) 40 mg/0.4 mL Syrg Inject 0.4 mLs (40 mg total) into the skin once daily. 12 mL 0    hydrocodone-acetaminophen 10-325mg (NORCO)  mg Tab Take 1 tablet by mouth every 4 (four) hours as needed for Pain. 18 tablet 0    ondansetron (ZOFRAN-ODT) 8 MG TbDL Take 1 tablet (8 mg total) by mouth every 6 (six) hours as needed. 60 tablet 0    oxycodone-acetaminophen (PERCOCET)  mg per tablet Take 1 tablet by mouth every 4 (four) hours as needed  "for Pain. 90 tablet 0     No current facility-administered medications on file prior to visit.      Review of patient's allergies indicates:  No Known Allergies  No family history on file.  Social History     Social History    Marital status: Single     Spouse name: N/A    Number of children: N/A    Years of education: N/A     Occupational History    Not on file.     Social History Main Topics    Smoking status: Current Every Day Smoker     Packs/day: 0.75     Types: Cigarettes    Smokeless tobacco: Not on file    Alcohol use Yes      Comment: weekends only    Drug use: No    Sexual activity: Not on file     Other Topics Concern    Not on file     Social History Narrative    No narrative on file       Physical Exam:  Ht 5' 10" (1.778 m)   Wt 81.6 kg (179 lb 14.3 oz)   BMI 25.81 kg/m²   Gen:  No acute distress  Head:  Normocephalic.  Atraumatic.  Neuro:  Intact  CV:  Peripherally well-perfused.  Pulses 2+ bilaterally.  Lungs:  Normal respiratory effort.  Abdomen:  Soft, non-tender, non-distended  Extremities:  No cyanosis, clubbing, or edema.  LLE: external fixator pin sites cleaned. Minimal serosanguinous drainage from pin sites.  Compartments soft.  Several blisters in various stages of healing along medial leg.  SILT.  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  Prior fibular plate and medial mall anchor.      ASSESSMENT/PLAN:  31 year old male with closed left tibial plafond fracture s/p external fixation on 6/9/17 .  Soft tissues are not yet amenable to surgery.  Will have him followup next week for skin check.  Tentatively booked for next week.    Diagnoses and all orders for this visit:    Closed displaced pilon fracture of left tibia, initial encounter          "

## 2017-06-20 ENCOUNTER — OFFICE VISIT (OUTPATIENT)
Dept: ORTHOPEDICS | Facility: CLINIC | Age: 32
End: 2017-06-20
Payer: OTHER MISCELLANEOUS

## 2017-06-20 VITALS
TEMPERATURE: 98 F | SYSTOLIC BLOOD PRESSURE: 106 MMHG | HEIGHT: 70 IN | HEART RATE: 89 BPM | DIASTOLIC BLOOD PRESSURE: 71 MMHG

## 2017-06-20 DIAGNOSIS — S82.872D CLOSED DISPLACED PILON FRACTURE OF LEFT TIBIA WITH ROUTINE HEALING, SUBSEQUENT ENCOUNTER: Primary | ICD-10-CM

## 2017-06-20 PROCEDURE — 99024 POSTOP FOLLOW-UP VISIT: CPT | Mod: S$GLB,,, | Performed by: ORTHOPAEDIC SURGERY

## 2017-06-20 PROCEDURE — 99999 PR PBB SHADOW E&M-EST. PATIENT-LVL III: CPT | Mod: PBBFAC,,, | Performed by: ORTHOPAEDIC SURGERY

## 2017-06-20 NOTE — PROGRESS NOTES
"HPI: Patient is a 31 y.o. male with h/o left gavin fx s/p ORIF (~2007 [place]) who sustained a closed left tibial plafond fracture on 6/8/17 after a boom hit is ankle at work. He is s/p spanning external fixator placement on 6/9/17.      He presents today for skin check prior to definitive fixation.    Physical Exam:  /71 (BP Location: Left arm, Patient Position: Sitting, BP Method: Automatic)   Pulse 89   Temp 98.1 °F (36.7 °C) (Oral)   Ht 5' 10" (1.778 m)   Gen:  No acute distress  LLE: external fixator pin sites clean without drainage..  Compartments soft.  Improved swelling but still shiny skin on anterior leg.  Medially skin wrinkles.  Blisters are healing and scabbing.  No sign of infection.    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  Prior fibular plate and medial mall anchor.    ASSESSMENT/PLAN:  31 year old male with closed left tibial plafond fracture s/p external fixation on 6/9/17 .  Soft tissues are not yet amenable to surgery.      Will have him followup next week for skin check.  Tentatively booked for 6/28    Nick was seen today for post-op evaluation.    Diagnoses and all orders for this visit:    Closed displaced pilon fracture of left tibia with routine healing, subsequent encounter          "

## 2017-06-27 ENCOUNTER — DOCUMENTATION ONLY (OUTPATIENT)
Dept: ORTHOPEDICS | Facility: CLINIC | Age: 32
End: 2017-06-27

## 2017-06-27 ENCOUNTER — OFFICE VISIT (OUTPATIENT)
Dept: ORTHOPEDICS | Facility: CLINIC | Age: 32
End: 2017-06-27
Payer: OTHER MISCELLANEOUS

## 2017-06-27 ENCOUNTER — ANESTHESIA EVENT (OUTPATIENT)
Dept: SURGERY | Facility: HOSPITAL | Age: 32
End: 2017-06-27
Payer: OTHER MISCELLANEOUS

## 2017-06-27 VITALS — WEIGHT: 179.88 LBS | BODY MASS INDEX: 25.75 KG/M2 | HEIGHT: 70 IN

## 2017-06-27 DIAGNOSIS — S82.872D CLOSED DISPLACED PILON FRACTURE OF LEFT TIBIA WITH ROUTINE HEALING, SUBSEQUENT ENCOUNTER: Primary | ICD-10-CM

## 2017-06-27 PROCEDURE — 99999 PR PBB SHADOW E&M-EST. PATIENT-LVL II: CPT | Mod: PBBFAC,,, | Performed by: ORTHOPAEDIC SURGERY

## 2017-06-27 PROCEDURE — 99024 POSTOP FOLLOW-UP VISIT: CPT | Mod: S$GLB,,, | Performed by: ORTHOPAEDIC SURGERY

## 2017-06-27 NOTE — PROGRESS NOTES
"HPI: Patient is a 31 y.o. male with h/o left gavin fx s/p ORIF (~2007 [place]) who sustained a closed left tibial plafond fracture on 6/8/17 after a boom hit is ankle at work. He is s/p spanning external fixator placement on 6/9/17.      He presents today for skin check prior to definitive fixation.    Physical Exam:  Ht 5' 10" (1.778 m)   Wt 81.6 kg (179 lb 14.3 oz)   BMI 25.81 kg/m²   Gen:  No acute distress  LLE: external fixator pin sites clean without drainage..  Compartments soft.  skin wrinkles.  Blisters are healing.  No sign of infection.    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  Prior fibular plate and medial mall anchor.    ASSESSMENT/PLAN:  31 year old male with closed left tibial plafond fracture s/p external fixation on 6/9/17 .  Soft tissues are amenable for surgery.  Will plan to proceed on 6/28 for definitive fixation.  He will arrive tomorrow and start ice and elevation immediately upon admission          "

## 2017-06-27 NOTE — PROGRESS NOTES
Spoke with pt while he was in clinic today.   Advised NPO after midnight.  Arrival time of 630 am tomorrow for ice and elevation while awaiting for sx to start

## 2017-06-28 ENCOUNTER — HOSPITAL ENCOUNTER (OUTPATIENT)
Facility: HOSPITAL | Age: 32
Discharge: HOME | End: 2017-06-28
Attending: ORTHOPAEDIC SURGERY | Admitting: ORTHOPAEDIC SURGERY
Payer: OTHER MISCELLANEOUS

## 2017-06-28 ENCOUNTER — ANESTHESIA (OUTPATIENT)
Dept: SURGERY | Facility: HOSPITAL | Age: 32
End: 2017-06-28
Payer: OTHER MISCELLANEOUS

## 2017-06-28 VITALS
TEMPERATURE: 99 F | SYSTOLIC BLOOD PRESSURE: 130 MMHG | HEIGHT: 70 IN | BODY MASS INDEX: 24.62 KG/M2 | OXYGEN SATURATION: 100 % | WEIGHT: 172 LBS | RESPIRATION RATE: 18 BRPM | DIASTOLIC BLOOD PRESSURE: 82 MMHG | HEART RATE: 71 BPM

## 2017-06-28 DIAGNOSIS — S82.872D CLOSED DISPLACED PILON FRACTURE OF LEFT TIBIA WITH ROUTINE HEALING, SUBSEQUENT ENCOUNTER: Primary | ICD-10-CM

## 2017-06-28 DIAGNOSIS — S82.872A CLOSED TRAUMATIC DISPLACED FRACTURE OF LEFT TIBIAL PLAFOND: ICD-10-CM

## 2017-06-28 PROCEDURE — 25000003 PHARM REV CODE 250: Performed by: ANESTHESIOLOGY

## 2017-06-28 PROCEDURE — D9220A PRA ANESTHESIA: Mod: ,,, | Performed by: ANESTHESIOLOGY

## 2017-06-28 PROCEDURE — 25000003 PHARM REV CODE 250: Performed by: ORTHOPAEDIC SURGERY

## 2017-06-28 PROCEDURE — 63600175 PHARM REV CODE 636 W HCPCS

## 2017-06-28 PROCEDURE — C1713 ANCHOR/SCREW BN/BN,TIS/BN: HCPCS | Performed by: ORTHOPAEDIC SURGERY

## 2017-06-28 PROCEDURE — 63600175 PHARM REV CODE 636 W HCPCS: Performed by: ANESTHESIOLOGY

## 2017-06-28 PROCEDURE — 36000711: Performed by: ORTHOPAEDIC SURGERY

## 2017-06-28 PROCEDURE — 15738 MUSCLE-SKIN GRAFT LEG: CPT | Mod: 58,,, | Performed by: ORTHOPAEDIC SURGERY

## 2017-06-28 PROCEDURE — 37000008 HC ANESTHESIA 1ST 15 MINUTES: Performed by: ORTHOPAEDIC SURGERY

## 2017-06-28 PROCEDURE — 71000015 HC POSTOP RECOV 1ST HR: Performed by: ORTHOPAEDIC SURGERY

## 2017-06-28 PROCEDURE — 76942 ECHO GUIDE FOR BIOPSY: CPT | Mod: 26,,, | Performed by: ANESTHESIOLOGY

## 2017-06-28 PROCEDURE — 27800517 HC TRAY,EPIDURAL-CONTINUOUS: Performed by: ANESTHESIOLOGY

## 2017-06-28 PROCEDURE — 36000710: Performed by: ORTHOPAEDIC SURGERY

## 2017-06-28 PROCEDURE — 25000003 PHARM REV CODE 250: Performed by: NURSE ANESTHETIST, CERTIFIED REGISTERED

## 2017-06-28 PROCEDURE — 63600175 PHARM REV CODE 636 W HCPCS: Performed by: NURSE ANESTHETIST, CERTIFIED REGISTERED

## 2017-06-28 PROCEDURE — C1769 GUIDE WIRE: HCPCS | Performed by: ORTHOPAEDIC SURGERY

## 2017-06-28 PROCEDURE — 64446 NJX AA&/STRD SC NRV NFS IMG: CPT | Performed by: ANESTHESIOLOGY

## 2017-06-28 PROCEDURE — 71000039 HC RECOVERY, EACH ADD'L HOUR: Performed by: ORTHOPAEDIC SURGERY

## 2017-06-28 PROCEDURE — 20694 RMVL EXT FIXJ SYS UNDER ANES: CPT | Mod: 58,51,, | Performed by: ORTHOPAEDIC SURGERY

## 2017-06-28 PROCEDURE — 71000033 HC RECOVERY, INTIAL HOUR: Performed by: ORTHOPAEDIC SURGERY

## 2017-06-28 PROCEDURE — 27201423 OPTIME MED/SURG SUP & DEVICES STERILE SUPPLY: Performed by: ORTHOPAEDIC SURGERY

## 2017-06-28 PROCEDURE — 27758 TREATMENT OF TIBIA FRACTURE: CPT | Mod: 58,51,LT, | Performed by: ORTHOPAEDIC SURGERY

## 2017-06-28 PROCEDURE — 37000009 HC ANESTHESIA EA ADD 15 MINS: Performed by: ORTHOPAEDIC SURGERY

## 2017-06-28 PROCEDURE — 25000003 PHARM REV CODE 250

## 2017-06-28 PROCEDURE — 27000221 HC OXYGEN, UP TO 24 HOURS

## 2017-06-28 DEVICE — SCREW LOCKING 3.5MM X 28MM: Type: IMPLANTABLE DEVICE | Site: TIBIA | Status: FUNCTIONAL

## 2017-06-28 DEVICE — SCREW LOCKING 3.5MM X 38MM: Type: IMPLANTABLE DEVICE | Site: TIBIA | Status: FUNCTIONAL

## 2017-06-28 DEVICE — SCREW CORTEX 3.5MM X 28MM: Type: IMPLANTABLE DEVICE | Site: TIBIA | Status: FUNCTIONAL

## 2017-06-28 DEVICE — SCREW LOCKING 3.5MM X 40MM: Type: IMPLANTABLE DEVICE | Site: TIBIA | Status: FUNCTIONAL

## 2017-06-28 DEVICE — SCREW LOCKING 3.5MM/32MM RECES: Type: IMPLANTABLE DEVICE | Site: TIBIA | Status: FUNCTIONAL

## 2017-06-28 DEVICE — IMPLANTABLE DEVICE: Type: IMPLANTABLE DEVICE | Site: TIBIA | Status: FUNCTIONAL

## 2017-06-28 DEVICE — SCREW CORTEX 3.5 X 40: Type: IMPLANTABLE DEVICE | Site: TIBIA | Status: FUNCTIONAL

## 2017-06-28 DEVICE — SCREW CORTEX 3.5 32M: Type: IMPLANTABLE DEVICE | Site: TIBIA | Status: FUNCTIONAL

## 2017-06-28 RX ORDER — LIDOCAINE HCL/PF 100 MG/5ML
SYRINGE (ML) INTRAVENOUS
Status: DISCONTINUED | OUTPATIENT
Start: 2017-06-28 | End: 2017-06-28

## 2017-06-28 RX ORDER — ACETAMINOPHEN 500 MG
1000 TABLET ORAL EVERY 6 HOURS
Status: DISCONTINUED | OUTPATIENT
Start: 2017-06-29 | End: 2017-06-28 | Stop reason: HOSPADM

## 2017-06-28 RX ORDER — POLYETHYLENE GLYCOL 3350 17 G/17G
17 POWDER, FOR SOLUTION ORAL DAILY PRN
Status: DISCONTINUED | OUTPATIENT
Start: 2017-06-28 | End: 2017-06-28 | Stop reason: HOSPADM

## 2017-06-28 RX ORDER — FENTANYL CITRATE 50 UG/ML
25 INJECTION, SOLUTION INTRAMUSCULAR; INTRAVENOUS EVERY 5 MIN PRN
Status: COMPLETED | OUTPATIENT
Start: 2017-06-28 | End: 2017-06-28

## 2017-06-28 RX ORDER — CEFAZOLIN SODIUM 2 G/50ML
2 SOLUTION INTRAVENOUS
Status: DISCONTINUED | OUTPATIENT
Start: 2017-06-28 | End: 2017-06-28 | Stop reason: HOSPADM

## 2017-06-28 RX ORDER — SODIUM CHLORIDE 0.9 % (FLUSH) 0.9 %
3 SYRINGE (ML) INJECTION
Status: DISCONTINUED | OUTPATIENT
Start: 2017-06-28 | End: 2017-06-28 | Stop reason: HOSPADM

## 2017-06-28 RX ORDER — FENTANYL CITRATE 50 UG/ML
INJECTION, SOLUTION INTRAMUSCULAR; INTRAVENOUS
Status: DISCONTINUED | OUTPATIENT
Start: 2017-06-28 | End: 2017-06-28

## 2017-06-28 RX ORDER — HYDROXYZINE HYDROCHLORIDE 25 MG/1
25 TABLET, FILM COATED ORAL 3 TIMES DAILY PRN
Status: DISCONTINUED | OUTPATIENT
Start: 2017-06-28 | End: 2017-06-28 | Stop reason: HOSPADM

## 2017-06-28 RX ORDER — HYDROMORPHONE HYDROCHLORIDE 1 MG/ML
INJECTION, SOLUTION INTRAMUSCULAR; INTRAVENOUS; SUBCUTANEOUS
Status: DISCONTINUED
Start: 2017-06-28 | End: 2017-06-28 | Stop reason: WASHOUT

## 2017-06-28 RX ORDER — ROCURONIUM BROMIDE 10 MG/ML
INJECTION, SOLUTION INTRAVENOUS
Status: DISCONTINUED | OUTPATIENT
Start: 2017-06-28 | End: 2017-06-28

## 2017-06-28 RX ORDER — CLINDAMYCIN PHOSPHATE 900 MG/50ML
INJECTION, SOLUTION INTRAVENOUS
Status: DISCONTINUED | OUTPATIENT
Start: 2017-06-28 | End: 2017-06-28

## 2017-06-28 RX ORDER — OXYCODONE HYDROCHLORIDE 5 MG/1
TABLET ORAL
Status: COMPLETED
Start: 2017-06-28 | End: 2017-06-28

## 2017-06-28 RX ORDER — PREGABALIN 50 MG/1
150 CAPSULE ORAL NIGHTLY
Status: DISCONTINUED | OUTPATIENT
Start: 2017-06-28 | End: 2017-06-28 | Stop reason: HOSPADM

## 2017-06-28 RX ORDER — SODIUM CHLORIDE 9 MG/ML
INJECTION, SOLUTION INTRAVENOUS CONTINUOUS
Status: DISCONTINUED | OUTPATIENT
Start: 2017-06-28 | End: 2017-06-28

## 2017-06-28 RX ORDER — FENTANYL CITRATE 50 UG/ML
INJECTION, SOLUTION INTRAMUSCULAR; INTRAVENOUS
Status: COMPLETED
Start: 2017-06-28 | End: 2017-06-28

## 2017-06-28 RX ORDER — DIPHENHYDRAMINE HCL 25 MG
25 CAPSULE ORAL EVERY 6 HOURS PRN
Status: DISCONTINUED | OUTPATIENT
Start: 2017-06-28 | End: 2017-06-28 | Stop reason: HOSPADM

## 2017-06-28 RX ORDER — MIDAZOLAM HYDROCHLORIDE 1 MG/ML
0.5 INJECTION INTRAMUSCULAR; INTRAVENOUS EVERY 5 MIN PRN
Status: DISCONTINUED | OUTPATIENT
Start: 2017-06-28 | End: 2017-06-28

## 2017-06-28 RX ORDER — HYDROMORPHONE HYDROCHLORIDE 1 MG/ML
0.2 INJECTION, SOLUTION INTRAMUSCULAR; INTRAVENOUS; SUBCUTANEOUS EVERY 5 MIN PRN
Status: DISCONTINUED | OUTPATIENT
Start: 2017-06-28 | End: 2017-06-28

## 2017-06-28 RX ORDER — ACETAMINOPHEN 10 MG/ML
1000 INJECTION, SOLUTION INTRAVENOUS ONCE
Status: COMPLETED | OUTPATIENT
Start: 2017-06-28 | End: 2017-06-28

## 2017-06-28 RX ORDER — GLYCOPYRROLATE 0.2 MG/ML
INJECTION INTRAMUSCULAR; INTRAVENOUS
Status: DISCONTINUED | OUTPATIENT
Start: 2017-06-28 | End: 2017-06-28

## 2017-06-28 RX ORDER — ONDANSETRON 2 MG/ML
4 INJECTION INTRAMUSCULAR; INTRAVENOUS EVERY 8 HOURS PRN
Status: DISCONTINUED | OUTPATIENT
Start: 2017-06-28 | End: 2017-06-28 | Stop reason: HOSPADM

## 2017-06-28 RX ORDER — ONDANSETRON 2 MG/ML
4 INJECTION INTRAMUSCULAR; INTRAVENOUS EVERY 12 HOURS PRN
Status: DISCONTINUED | OUTPATIENT
Start: 2017-06-28 | End: 2017-06-28 | Stop reason: HOSPADM

## 2017-06-28 RX ORDER — OXYCODONE AND ACETAMINOPHEN 10; 325 MG/1; MG/1
1 TABLET ORAL EVERY 6 HOURS PRN
Qty: 75 TABLET | Refills: 0 | Status: SHIPPED | OUTPATIENT
Start: 2017-06-28

## 2017-06-28 RX ORDER — CELECOXIB 200 MG/1
400 CAPSULE ORAL ONCE
Status: COMPLETED | OUTPATIENT
Start: 2017-06-28 | End: 2017-06-28

## 2017-06-28 RX ORDER — NEOSTIGMINE METHYLSULFATE 1 MG/ML
INJECTION, SOLUTION INTRAVENOUS
Status: DISCONTINUED | OUTPATIENT
Start: 2017-06-28 | End: 2017-06-28

## 2017-06-28 RX ORDER — ROPIVACAINE HYDROCHLORIDE 2 MG/ML
8 INJECTION, SOLUTION EPIDURAL; INFILTRATION; PERINEURAL CONTINUOUS
Status: DISCONTINUED | OUTPATIENT
Start: 2017-06-28 | End: 2017-06-28 | Stop reason: HOSPADM

## 2017-06-28 RX ORDER — OXYCODONE HYDROCHLORIDE 5 MG/1
10 TABLET ORAL EVERY 4 HOURS PRN
Status: DISCONTINUED | OUTPATIENT
Start: 2017-06-28 | End: 2017-06-28 | Stop reason: HOSPADM

## 2017-06-28 RX ORDER — MUPIROCIN 20 MG/G
1 OINTMENT TOPICAL
Status: COMPLETED | OUTPATIENT
Start: 2017-06-28 | End: 2017-06-28

## 2017-06-28 RX ORDER — PROPOFOL 10 MG/ML
VIAL (ML) INTRAVENOUS
Status: DISCONTINUED | OUTPATIENT
Start: 2017-06-28 | End: 2017-06-28

## 2017-06-28 RX ORDER — ACETAMINOPHEN 10 MG/ML
INJECTION, SOLUTION INTRAVENOUS
Status: DISCONTINUED | OUTPATIENT
Start: 2017-06-28 | End: 2017-06-28

## 2017-06-28 RX ORDER — ACETAMINOPHEN 325 MG/1
650 TABLET ORAL EVERY 6 HOURS PRN
Status: DISCONTINUED | OUTPATIENT
Start: 2017-06-28 | End: 2017-06-28

## 2017-06-28 RX ORDER — MIDAZOLAM HYDROCHLORIDE 1 MG/ML
INJECTION INTRAMUSCULAR; INTRAVENOUS
Status: DISCONTINUED | OUTPATIENT
Start: 2017-06-28 | End: 2017-06-28

## 2017-06-28 RX ORDER — ONDANSETRON 8 MG/1
8 TABLET, ORALLY DISINTEGRATING ORAL EVERY 8 HOURS PRN
Status: DISCONTINUED | OUTPATIENT
Start: 2017-06-28 | End: 2017-06-28 | Stop reason: HOSPADM

## 2017-06-28 RX ORDER — MORPHINE SULFATE 2 MG/ML
3 INJECTION, SOLUTION INTRAMUSCULAR; INTRAVENOUS
Status: DISCONTINUED | OUTPATIENT
Start: 2017-06-28 | End: 2017-06-28

## 2017-06-28 RX ORDER — CELECOXIB 200 MG/1
200 CAPSULE ORAL 2 TIMES DAILY
Status: DISCONTINUED | OUTPATIENT
Start: 2017-06-28 | End: 2017-06-28 | Stop reason: HOSPADM

## 2017-06-28 RX ORDER — OXYCODONE HYDROCHLORIDE 5 MG/1
15 TABLET ORAL EVERY 4 HOURS PRN
Status: DISCONTINUED | OUTPATIENT
Start: 2017-06-28 | End: 2017-06-28 | Stop reason: HOSPADM

## 2017-06-28 RX ORDER — FENTANYL CITRATE 50 UG/ML
25 INJECTION, SOLUTION INTRAMUSCULAR; INTRAVENOUS EVERY 5 MIN PRN
Status: DISCONTINUED | OUTPATIENT
Start: 2017-06-28 | End: 2017-06-28

## 2017-06-28 RX ORDER — CLONIDINE HYDROCHLORIDE 0.1 MG/1
0.1 TABLET ORAL 3 TIMES DAILY PRN
Status: DISCONTINUED | OUTPATIENT
Start: 2017-06-28 | End: 2017-06-28 | Stop reason: HOSPADM

## 2017-06-28 RX ORDER — BACITRACIN ZINC 500 UNIT/G
OINTMENT (GRAM) TOPICAL
Status: DISCONTINUED | OUTPATIENT
Start: 2017-06-28 | End: 2017-06-28 | Stop reason: HOSPADM

## 2017-06-28 RX ORDER — LIDOCAINE HYDROCHLORIDE 10 MG/ML
1 INJECTION, SOLUTION EPIDURAL; INFILTRATION; INTRACAUDAL; PERINEURAL ONCE
Status: COMPLETED | OUTPATIENT
Start: 2017-06-28 | End: 2017-06-28

## 2017-06-28 RX ORDER — ONDANSETRON 2 MG/ML
4 INJECTION INTRAMUSCULAR; INTRAVENOUS ONCE AS NEEDED
Status: DISCONTINUED | OUTPATIENT
Start: 2017-06-28 | End: 2017-06-28 | Stop reason: HOSPADM

## 2017-06-28 RX ORDER — DEXAMETHASONE SODIUM PHOSPHATE 4 MG/ML
INJECTION, SOLUTION INTRA-ARTICULAR; INTRALESIONAL; INTRAMUSCULAR; INTRAVENOUS; SOFT TISSUE
Status: DISCONTINUED | OUTPATIENT
Start: 2017-06-28 | End: 2017-06-28

## 2017-06-28 RX ORDER — ROPIVACAINE HYDROCHLORIDE 2 MG/ML
INJECTION, SOLUTION EPIDURAL; INFILTRATION; PERINEURAL
Status: COMPLETED
Start: 2017-06-28 | End: 2017-06-28

## 2017-06-28 RX ORDER — CLINDAMYCIN PHOSPHATE 900 MG/50ML
900 INJECTION, SOLUTION INTRAVENOUS
Status: DISCONTINUED | OUTPATIENT
Start: 2017-06-28 | End: 2017-06-28 | Stop reason: HOSPADM

## 2017-06-28 RX ADMIN — ROCURONIUM BROMIDE 30 MG: 10 INJECTION, SOLUTION INTRAVENOUS at 11:06

## 2017-06-28 RX ADMIN — FENTANYL CITRATE 100 MCG: 50 INJECTION, SOLUTION INTRAMUSCULAR; INTRAVENOUS at 12:06

## 2017-06-28 RX ADMIN — SODIUM CHLORIDE, SODIUM GLUCONATE, SODIUM ACETATE, POTASSIUM CHLORIDE, MAGNESIUM CHLORIDE, SODIUM PHOSPHATE, DIBASIC, AND POTASSIUM PHOSPHATE: .53; .5; .37; .037; .03; .012; .00082 INJECTION, SOLUTION INTRAVENOUS at 10:06

## 2017-06-28 RX ADMIN — ACETAMINOPHEN 1000 MG: 10 INJECTION, SOLUTION INTRAVENOUS at 12:06

## 2017-06-28 RX ADMIN — FENTANYL CITRATE 25 MCG: 50 INJECTION INTRAMUSCULAR; INTRAVENOUS at 04:06

## 2017-06-28 RX ADMIN — FENTANYL CITRATE 25 MCG: 50 INJECTION INTRAMUSCULAR; INTRAVENOUS at 02:06

## 2017-06-28 RX ADMIN — OXYCODONE HYDROCHLORIDE 15 MG: 5 TABLET ORAL at 02:06

## 2017-06-28 RX ADMIN — DEXAMETHASONE SODIUM PHOSPHATE 8 MG: 4 INJECTION, SOLUTION INTRAMUSCULAR; INTRAVENOUS at 12:06

## 2017-06-28 RX ADMIN — FENTANYL CITRATE 25 MCG: 50 INJECTION INTRAMUSCULAR; INTRAVENOUS at 03:06

## 2017-06-28 RX ADMIN — FENTANYL CITRATE 50 MCG: 50 INJECTION, SOLUTION INTRAMUSCULAR; INTRAVENOUS at 01:06

## 2017-06-28 RX ADMIN — ROCURONIUM BROMIDE 40 MG: 10 INJECTION, SOLUTION INTRAVENOUS at 10:06

## 2017-06-28 RX ADMIN — LIDOCAINE HYDROCHLORIDE 50 MG: 20 INJECTION, SOLUTION INTRAVENOUS at 10:06

## 2017-06-28 RX ADMIN — CLINDAMYCIN PHOSPHATE 900 MG: 18 INJECTION, SOLUTION INTRAVENOUS at 10:06

## 2017-06-28 RX ADMIN — PROPOFOL 200 MG: 10 INJECTION, EMULSION INTRAVENOUS at 10:06

## 2017-06-28 RX ADMIN — SODIUM CHLORIDE: 0.9 INJECTION, SOLUTION INTRAVENOUS at 08:06

## 2017-06-28 RX ADMIN — CELECOXIB 400 MG: 200 CAPSULE ORAL at 03:06

## 2017-06-28 RX ADMIN — FENTANYL CITRATE 50 MCG: 50 INJECTION, SOLUTION INTRAMUSCULAR; INTRAVENOUS at 11:06

## 2017-06-28 RX ADMIN — MIDAZOLAM HYDROCHLORIDE 2 MG: 1 INJECTION, SOLUTION INTRAMUSCULAR; INTRAVENOUS at 10:06

## 2017-06-28 RX ADMIN — ROCURONIUM BROMIDE 10 MG: 10 INJECTION, SOLUTION INTRAVENOUS at 11:06

## 2017-06-28 RX ADMIN — FENTANYL CITRATE 100 MCG: 50 INJECTION, SOLUTION INTRAMUSCULAR; INTRAVENOUS at 10:06

## 2017-06-28 RX ADMIN — LIDOCAINE HYDROCHLORIDE 0.2 MG: 10 INJECTION, SOLUTION EPIDURAL; INFILTRATION; INTRACAUDAL; PERINEURAL at 08:06

## 2017-06-28 RX ADMIN — GLYCOPYRROLATE 0.4 MG: 0.2 INJECTION, SOLUTION INTRAMUSCULAR; INTRAVENOUS at 01:06

## 2017-06-28 RX ADMIN — ACETAMINOPHEN 1000 MG: 10 INJECTION, SOLUTION INTRAVENOUS at 03:06

## 2017-06-28 RX ADMIN — MUPIROCIN 1 G: 20 OINTMENT TOPICAL at 08:06

## 2017-06-28 RX ADMIN — ROPIVACAINE HYDROCHLORIDE 8 ML/HR: 2 INJECTION, SOLUTION EPIDURAL; INFILTRATION at 03:06

## 2017-06-28 RX ADMIN — OXYCODONE HYDROCHLORIDE 15 MG: 5 TABLET ORAL at 04:06

## 2017-06-28 RX ADMIN — ROPIVACAINE HYDROCHLORIDE 8 ML/HR: 2 INJECTION, SOLUTION EPIDURAL; INFILTRATION; PERINEURAL at 03:06

## 2017-06-28 RX ADMIN — NEOSTIGMINE METHYLSULFATE 4 MG: 1 INJECTION INTRAVENOUS at 01:06

## 2017-06-28 RX ADMIN — ROCURONIUM BROMIDE 10 MG: 10 INJECTION, SOLUTION INTRAVENOUS at 10:06

## 2017-06-28 RX ADMIN — Medication 2 G: at 10:06

## 2017-06-28 RX ADMIN — FENTANYL CITRATE 50 MCG: 50 INJECTION, SOLUTION INTRAMUSCULAR; INTRAVENOUS at 12:06

## 2017-06-28 NOTE — DISCHARGE INSTRUCTIONS
Having Ankle Fracture Open Reduction and Internal Fixation (ORIF)  Open reduction and internal fixation (ORIF) is a type of treatment to fix a broken bone. It puts the pieces of a broken bone back together so they can heal. Open reduction means the bones are put back in place during a surgery. Internal fixation means that special hardware is used to hold the bone pieces together. This helps the bone heals correctly. The procedure is done by an orthopedic surgeon. This is a doctor with special training in treating bone, joint, and muscle problems.  What to tell your healthcare provider  Make sure you tell the healthcare provider about all medicines you take, including over-the-counter medicines, such as aspirin. Tell him or her about all vitamins, herbs, and other supplements you take. Also tell the provider the last time you had something to eat or drink. And tell your provider if you:  · Have had any recent changes in your health, such as an infection or fever  · Are sensitive or allergic to any medicines, latex, tape, or anesthetic medicines (local and general)  · Are pregnant or think you may be pregnant  Tests before your surgery  You may have an X-ray or a CT scan to look at your tibia and fibula.  Getting ready for your surgery  ORIF often takes place as emergency surgery after an accident or injury. Before this procedure, a healthcare provider will ask about your health history and give you a physical exam.  In some cases, ankle fracture ORIF is planned. Your surgery may be done after the swelling in your ankle has gone down. You might need to have your ankle held in place while you wait for your surgery. Talk with your healthcare provider how to get ready for your surgery. You may need to stop taking some medicines before the procedure, such as blood thinners and aspirin. If you smoke, you may need to stop before your surgery. Smoking can delay healing. Talk with your healthcare provider if you need help  to stop smoking.  Also, make sure to:  · Ask a family member or friend to take you home from the hospital. You cannot drive yourself.  · Plan some changes at home to help you recover. You may need help at home.  · Not eat or drink after midnight the night before your surgery.  · Follow all other instructions from your healthcare provider.  You may be asked to sign a consent form that gives your permission to do the procedure. Read the form carefully. Ask questions if something is not clear.  On the day of surgery  Your surgeon will explain the details of your surgery. These details will depend on where your injury is and how serious it is. An orthopedic surgeon and a team of specialized nurses will do the surgery. The preparation and surgery may take a couple of hours. In general, you can expect the following:  · You will likely have general anesthesia.This is medicine to prevent pain and make you sleep through the surgery. Or you may have regional anesthesia to numb the area and medicine to help you relax and sleep through the surgery.  · A healthcare provider watches your vital signs, like your heart rate and blood pressure, during the surgery.  · After cleaning the skin, your surgeon will make a cut (incision) through the skin and muscle of your ankle.  · The surgeon will put the pieces of your ankle bones back into alignment (reduction).  · The pieces of the broken bones will be secured to each other (fixation). Your doctor may use screws, metal plates, wires, or pins.  · Other repairs are made to the area as needed.  · The layers of muscle and skin around your ankle will be closed with stitches (sutures).  After your surgery  Talk with your surgeon about what you can expect after your surgery. You may go home the same day. Or you may stay overnight in the hospital. Before leaving the hospital, you will likely have X-rays taken of your ankle. This is to check the repair.  You will have some pain after the  surgery. Your doctor will tell you what pain medicine you can take to help reduce the pain. Avoid certain over-the-counter medicines for pain as instructed. Some of these may interfere with bone healing. You can also use ice packs to help lessen pain and swelling.  You may be told to keep your ankle elevated for a period of time after your surgery. Youll also need to not move your ankle for a while. Often, this means wearing a brace, perhaps for several weeks. Youll get instructions about how to move your leg and when you can put weight on it. Your surgeon may also tell you to eat foods high in calcium and vitamin D to help with bone healing. You may need to take medicine to prevent blood clots (blood thinner) for a little while after your surgery. Follow all your doctors instructions carefully.  Follow-up care  Make sure to keep all of your follow-up appointments. You may need to have your stitches removed a week or so after your surgery.  You may have physical therapy to improve the strength and movement of your ankle. The therapy may include treatments and exercises. The therapy improves your chances of a full recovery. Most people are able to return to all their normal activities within a few months.     When to call your healthcare provider  Call your healthcare provider right away if you have any of these:  · Fever of 100.4°F (38°C) or higher  · Redness, swelling, or fluid leaking from your incision that gets worse  · Pain that gets worse  · Loss of feeling in your foot or leg   Date Last Reviewed: 8/6/2015  © 8388-5033 [employer]. [address] [place] [address], [place] [address]. All rights reserved. This information is not intended as a substitute for professional medical care. Always follow your healthcare professional's instructions.

## 2017-06-28 NOTE — ANESTHESIA PREPROCEDURE EVALUATION
06/28/2017  Nick Medel Jr. is a 31 y.o., male.    Pre-operative evaluation for Procedure(s) (LRB):  OPEN REDUCTION INTERNAL FIXATION- PILON. EX FIX REMOVAL.  (Left)    Nick Medel Jr. is a 31 y.o. male     Patient Active Problem List   Diagnosis    Closed displaced pilon fracture of left tibia    Closed traumatic displaced fracture of left tibial plafond       Review of patient's allergies indicates:   Allergen Reactions    Morphine      Jittery / mean       No current facility-administered medications on file prior to encounter.      Current Outpatient Prescriptions on File Prior to Encounter   Medication Sig Dispense Refill    ondansetron (ZOFRAN-ODT) 8 MG TbDL Take 1 tablet (8 mg total) by mouth every 6 (six) hours as needed. 60 tablet 0    oxycodone-acetaminophen (PERCOCET)  mg per tablet Take 1 tablet by mouth every 4 (four) hours as needed for Pain. 90 tablet 0    docusate sodium (COLACE) 100 MG capsule Take 1 capsule (100 mg total) by mouth 2 (two) times daily. 60 capsule 0    hydrocodone-acetaminophen 10-325mg (NORCO)  mg Tab Take 1 tablet by mouth every 4 (four) hours as needed for Pain. 18 tablet 0       Past Surgical History:   Procedure Laterality Date    leg       fractured    LEG SURGERY Left        Social History     Social History    Marital status: Single     Spouse name: N/A    Number of children: N/A    Years of education: N/A     Occupational History    Not on file.     Social History Main Topics    Smoking status: Current Every Day Smoker     Packs/day: 0.75     Types: Cigarettes    Smokeless tobacco: Never Used    Alcohol use Yes      Comment: weekends only    Drug use: No    Sexual activity: Not on file     Other Topics Concern    Not on file     Social History Narrative    No narrative on file         Vital Signs Range (Last  24H):  Temp:  [36.7 °C (98.1 °F)]   Pulse:  [77]   Resp:  [18]   BP: (103)/(67)   SpO2:  [100 %]           Anesthesia Evaluation    I have reviewed the Patient Summary Reports.    I have reviewed the Nursing Notes.   I have reviewed the Medications.     Review of Systems  Anesthesia Hx:  No problems with previous Anesthesia  History of prior surgery of interest to airway management or planning: Denies Family Hx of Anesthesia complications.    Social:  Smoker, Social Alcohol Use    Hematology/Oncology:  Hematology Normal        Cardiovascular:   Exercise tolerance: good Denies MI.      Pulmonary:  Pulmonary Normal    Renal/:  Renal/ Normal     Hepatic/GI:  Hepatic/GI Normal    Neurological:  Neurology Normal    Endocrine:  Endocrine Normal    Psych:  Psychiatric Normal           Physical Exam  General:  Well nourished    Airway/Jaw/Neck:  Airway Findings: Mouth Opening: Normal Tongue: Normal  General Airway Assessment: Adult  Mallampati: I     Eyes/Ears/Nose:  Eyes/Ears/Nose Findings:    Dental:  Dental Findings: In tact   Chest/Lungs:  Chest/Lungs Findings: Clear to auscultation, Normal Respiratory Rate     Heart/Vascular:  Heart Findings: Rate: Normal  Rhythm: Regular Rhythm        Mental Status:  Mental Status Findings:  Cooperative, Alert and Oriented         Anesthesia Plan  Type of Anesthesia, risks & benefits discussed:  Anesthesia Type:  regional, general  Patient's Preference:   Intra-op Monitoring Plan: standard ASA monitors  Intra-op Monitoring Plan Comments:   Post Op Pain Control Plan: IV/PO Opioids PRN, multimodal analgesia and peripheral nerve block  Post Op Pain Control Plan Comments:   Induction:   IV  Beta Blocker:  Patient is not currently on a Beta-Blocker (No further documentation required).       Informed Consent: Patient understands risks and agrees with Anesthesia plan.  Questions answered. Anesthesia consent signed with patient.  ASA Score: 1     Day of Surgery Review of History &  Physical:        Anesthesia Plan Notes: Plan for post- op block per dr garcia        Ready For Surgery From Anesthesia Perspective.

## 2017-06-28 NOTE — DISCHARGE SUMMARY
[facility]  Discharge Note  Short Stay    Admit Date: 6/28/2017    Discharge Date and Time: 3:58 PM 06/28/2017      Attending Physician: Brandyn Dhillon MD     Discharge Provider: Norberto Haque    Diagnoses:  Active Hospital Problems    Diagnosis  POA    Closed traumatic displaced fracture of left tibial plafond [S82.872A]  Yes      Resolved Hospital Problems    Diagnosis Date Resolved POA   No resolved problems to display.       Discharged Condition: good    Hospital Course: Patient was admitted for an outpatient procedure and tolerated the procedure well with no complications.    Final Diagnoses: Same as principal problem.    Disposition: Home or Self Care    Follow up/Patient Instructions:    Medications:  Reconciled Home Medications:   Current Discharge Medication List      CONTINUE these medications which have CHANGED    Details   oxycodone-acetaminophen (PERCOCET)  mg per tablet Take 1 tablet by mouth every 6 (six) hours as needed for Pain.  Qty: 75 tablet, Refills: 0         CONTINUE these medications which have NOT CHANGED    Details   ondansetron (ZOFRAN-ODT) 8 MG TbDL Take 1 tablet (8 mg total) by mouth every 6 (six) hours as needed.  Qty: 60 tablet, Refills: 0      docusate sodium (COLACE) 100 MG capsule Take 1 capsule (100 mg total) by mouth 2 (two) times daily.  Qty: 60 capsule, Refills: 0         STOP taking these medications       hydrocodone-acetaminophen 10-325mg (NORCO)  mg Tab Comments:   Reason for Stopping:               Discharge Procedure Orders  Diet general     Weight bearing restrictions (specify)   Order Comments: No weight bearing - operative extremity       Call MD for:  temperature >100.4     Call MD for:  persistent nausea and vomiting or diarrhea     Call MD for:  severe uncontrolled pain     Call MD for:  difficulty breathing or increased cough     Call MD for:  worsening rash     Call MD for:  persistent dizziness, light-headedness, or visual disturbances      Call MD for:  increased confusion or weakness     Ice to affected area     Keep surgical extremity elevated     Leave dressing on - Keep it clean, dry, and intact until clinic visit       Follow-up Information     Brandyn Dhillon MD In 2 weeks.    Specialty:  Orthopedic Surgery  Contact information:  [address]  [facility] [id number]  [phone]                   Discharge Procedure Orders (must include Diet, Follow-up, Activity):    Discharge Procedure Orders (must include Diet, Follow-up, Activity)  Diet general     Weight bearing restrictions (specify)   Order Comments: No weight bearing - operative extremity       Call MD for:  temperature >100.4     Call MD for:  persistent nausea and vomiting or diarrhea     Call MD for:  severe uncontrolled pain     Call MD for:  difficulty breathing or increased cough     Call MD for:  worsening rash     Call MD for:  persistent dizziness, light-headedness, or visual disturbances     Call MD for:  increased confusion or weakness     Ice to affected area     Keep surgical extremity elevated     Leave dressing on - Keep it clean, dry, and intact until clinic visit

## 2017-06-28 NOTE — BRIEF OP NOTE
BRIEF OP NOTE    Preop Dx: Left distal tibia fracture status post external fixation     Postop Dx: Left distal tibia fracture status post external fixation     Extensive tissue contracture/scar secondary to prior ankle surgery    Procedure: 1.  Open treatment of left distal tibial shaft/metaphyseal fracture with internal fixation    2.  Removal of external fixation, under anesthesia, left leg     3.  Fasciocutaneous flaps left medial ankle 10cm    Surgeon: Brandyn Dhillon M.D.    Asst:  Norberto Haque M.D    Anesthesia: GETA    EBL:  10cc    IVF:  2000cc crystalloid    Implants: Synthes distal medial tibial plate    Specimens: None    Findings: Scarred tissue medial secondary to old ankle fracture.  Stable fixation.  Flaps developed for tension free closure    Dispo:  To PACU extubated/stable    Dict#  [id number]

## 2017-06-28 NOTE — TRANSFER OF CARE
"Anesthesia Transfer of Care Note    Patient: Nick Medel Jr.    Procedure(s) Performed: Procedure(s) (LRB):  OPEN REDUCTION INTERNAL FIXATION- PILON. EX FIX REMOVAL.  (Left)    Patient location: PACU    Anesthesia Type: general    Transport from OR: Transported from OR on 100% O2 by closed face mask with adequate spontaneous ventilation    Post pain: adequate analgesia    Post assessment: no apparent anesthetic complications    Post vital signs: stable    Level of consciousness: sedated    Nausea/Vomiting: no nausea/vomiting    Complications: none    Transfer of care protocol was followed      Last vitals:   Visit Vitals  /67 (BP Location: Left arm, Patient Position: Lying, BP Method: Automatic)   Pulse 77   Temp 36.7 °C (98.1 °F) (Oral)   Resp 18   Ht 5' 10" (1.778 m)   Wt 78 kg (172 lb)   SpO2 100%   BMI 24.68 kg/m²     "

## 2017-06-28 NOTE — ANESTHESIA POSTPROCEDURE EVALUATION
"Anesthesia Post Evaluation    Patient: Nick Medel Jr.    Procedure(s) Performed: Procedure(s) (LRB):  OPEN REDUCTION INTERNAL FIXATION- PILON. EX FIX REMOVAL.  (Left)    Final Anesthesia Type: regional  Patient location during evaluation: PACU  Patient participation: Yes- Able to Participate  Level of consciousness: awake and alert  Post-procedure vital signs: reviewed and stable  Pain management: adequate  Airway patency: patent  PONV status at discharge: No PONV  Anesthetic complications: no      Cardiovascular status: blood pressure returned to baseline and hemodynamically stable  Respiratory status: unassisted, spontaneous ventilation and room air  Hydration status: euvolemic  Follow-up not needed.        Visit Vitals  /72 (BP Location: Left arm, Patient Position: Lying, BP Method: Automatic)   Pulse 74   Temp 36.1 °C (97 °F) (Axillary)   Resp 11   Ht 5' 10" (1.778 m)   Wt 78 kg (172 lb)   SpO2 97%   BMI 24.68 kg/m²       Pain/Misa Score: Pain Assessment Performed: Yes (6/28/2017  1:45 PM)  Presence of Pain: complains of pain/discomfort (6/28/2017  1:45 PM)  Pain Rating Prior to Med Admin: 5 (6/28/2017  3:03 PM)  Misa Score: 10 (6/28/2017  1:45 PM)      "

## 2017-06-28 NOTE — PROGRESS NOTES
Pt and family present, consent to converting adductor PNC to On Q.  Site CDI.  Educated regarding continued pain management, fall risk, signs of complications, continued monitoring, as well as discontinuing catheter on 6/30.  Two numbers obtained for APS to follow up.  Understanding verbalized.

## 2017-06-28 NOTE — H&P (VIEW-ONLY)
[facility]  Orthopedics  H&P    Patient Name: Nick Medel  MRN: [id number]  Admission Date: 6/8/2017  Primary Care Provider: Primary Doctor No    Patient information was obtained from patient and ER records.     Subjective:     Principal Problem:Closed displaced pilon fracture of left tibia    Chief Complaint:   Chief Complaint   Patient presents with    Tib/Fib Fracture/Transfer        HPI: Patient is a 31 y.o. male with h/o left gavin fx s/p ORIF (~2007 [place]) presents with left ankle pain.  He was working when a boom hit his left ankle.  He noticed immediate pain and inability to bear weight and was taken to [facility] where he was placed in a splint and directed to follow up with orthopedic surgeon.  They elected to come to [facility] ED.  Endorses some paresthesias diffusely to foot.  No other injuries.    History reviewed. No pertinent past medical history.    Past Surgical History:   Procedure Laterality Date    leg       fractured    LEG SURGERY Left        Review of patient's allergies indicates:  No Known Allergies    No current facility-administered medications for this encounter.      Current Outpatient Prescriptions   Medication Sig    hydrocodone-acetaminophen 10-325mg (NORCO)  mg Tab Take 1 tablet by mouth every 4 (four) hours as needed for Pain.     Family History     None        Social History Main Topics    Smoking status: Current Every Day Smoker     Packs/day: 0.75     Types: Cigarettes    Smokeless tobacco: Not on file    Alcohol use Yes      Comment: weekends only    Drug use: No    Sexual activity: Not on file     ROS     See HPI    Objective:     Vital Signs (Most Recent):  Temp: 98.3 °F (36.8 °C) (06/08/17 1422)  Pulse: 66 (06/08/17 1422)  Resp: 16 (06/08/17 1422)  BP: (!) 146/83 (06/08/17 1422)  SpO2: 99 % (06/08/17 1422) Vital Signs (24h Range):  Temp:  [97.7 °F (36.5 °C)-98.3 °F (36.8 °C)] 98.3 °F (36.8 °C)  Pulse:  [66-84] 66  Resp:  [15-18]  "16  SpO2:  [98 %-99 %] 99 %  BP: (122-146)/(64-83) 146/83     Weight: 81.6 kg (180 lb)  Height: 5' 10" (177.8 cm)  Body mass index is 25.83 kg/m².    No intake or output data in the 24 hours ending 06/08/17 1628    Ortho/SPM Exam    Gen:  No acute distress  CV:  Peripherally well-perfused.  Pulses 2+ bilaterally.  Lungs:  Normal respiratory effort.  Head/Neck:  Normocephalic.  Atraumatic.   Neuro:  CN intact without deficit, SILT throughout B/L Upper & Lower Extremities  Pelvis: No TTP, Stable to direct anterior pressure over ASIS.    MSK:  LLE:  - moderate-severe swelling to lower leg  - 2 superficial scrapes anterior, do not probe deep  - all compartments assessed in isolation, all are very swollen but are compressible  - ttp to distal tibia and fibular shaft  - no pain with PROM of foot/toes/ankle  - limited AROM foot/ankle 2/2 pain  - TA/EHL/Gastroc/FHL assessed in isolation without deficit  - SILT throughout  - DP and PT palpated  2+  - Capillary Refill <3s       Significant Labs:   BMP: No results for input(s): GLU, NA, K, CL, CO2, BUN, CREATININE, CALCIUM, MG in the last 48 hours.  CBC:   Recent Labs  Lab 06/08/17  1539   WBC 21.29*   HGB 14.1   HCT 42.8        All pertinent labs within the past 24 hours have been reviewed.    Significant Imaging:   Left pilon and fibular shaft fx    Assessment/Plan:     * Closed displaced pilon fracture of left tibia    Currently does not have compartment syndrome, however he is quite swollen and there is concern for potential development of this.    - will admit to orthopedics and take emergently to OR for ex-fix: booked/marked/consent obtained  - NPO  - pain control  - elevate/ice at all times  - bed rest            Mohit Cisneros MD  Orthopedics  [facility]    "

## 2017-06-28 NOTE — PROGRESS NOTES
Dr. Fuentes and Dr. Maradiaga at bedside for Left leg block.  BP set to 5min, Time out performed, consents verified, pt agrees to Left side. Allergies reviewed.

## 2017-06-28 NOTE — ANESTHESIA RELEASE NOTE
"Anesthesia Release from PACU Note    Patient: Nick Medel Jr.    Procedure(s) Performed: Procedure(s) (LRB):  OPEN REDUCTION INTERNAL FIXATION- PILON. EX FIX REMOVAL.  (Left)    Anesthesia type: regional    Post pain: Adequate analgesia    Post assessment: no apparent anesthetic complications, tolerated procedure well and no evidence of recall    Last Vitals:   Visit Vitals  /72 (BP Location: Left arm, Patient Position: Lying, BP Method: Automatic)   Pulse 74   Temp 36.1 °C (97 °F) (Axillary)   Resp 11   Ht 5' 10" (1.778 m)   Wt 78 kg (172 lb)   SpO2 97%   BMI 24.68 kg/m²       Post vital signs: stable    Level of consciousness: awake and alert     Nausea/Vomiting: no nausea/no vomiting    Complications: none    Airway Patency: patent    Respiratory: unassisted, spontaneous ventilation, room air    Cardiovascular: stable and blood pressure at baseline    Hydration: euvolemic  "

## 2017-06-28 NOTE — INTERVAL H&P NOTE
Patient with ex fix since original injury.  Skin now wrinkles.  Anterior scab still present.  Medial scab resolved.  Left distal tibial fracture above plafond.  Comminuted.  Some varus.  Old lateral plate still present.  To OR for ORIF and ex fix removal.  The risks, benefits and alternatives to surgery were discussed with the patient at great length.  These include bleeding, infection, vessel/nerve damage, pain, numbness, tingling, complex regional pain syndrome, hardware/surgical failure, need for further surgery, malunion, nonunion, DVT, PE, arthritis and death.  I also explained that it may not be possible to entirely get him out of varus with synostosis distal.  Patient states an understanding and wishes to proceed with surgery.   All questions were answered.  No guarantees were implied or stated.  Informed consent was obtained.    Brandyn Dhillon MD

## 2017-06-29 NOTE — PLAN OF CARE
Patient arrived from PACU with DAJA Lancaster RN.  Patient stable.  Patient due to void. Report received at this time.  Assumed care of patient at this time.

## 2017-06-29 NOTE — PLAN OF CARE
Patient and patient's girlfriend received discharge instructions and prescriptions from FREDO Ward RN.  Patient and patient's girlfriend verbalized understanding of all instructions given and all questions were addressed prior to patient's discharge.  Patient's vital signs are stable and within patient's baseline.  Patient tolerated clear liquids PO.  Patient voided 250 mL of clear yellow urine without difficulty in post-op.  Patient states pain is 3/10 and tolerable.  Patient denies nausea and vomiting at this time.  Patient meets all criteria for discharge at this time.  All required consents present in patient's chart upon patient's discharge.

## 2017-06-30 NOTE — ADDENDUM NOTE
Addendum  created 06/30/17 1010 by Ishan Lam MD    Anesthesia Event edited, Sign clinical note

## 2017-07-13 ENCOUNTER — OFFICE VISIT (OUTPATIENT)
Dept: ORTHOPEDICS | Facility: CLINIC | Age: 32
End: 2017-07-13
Payer: OTHER MISCELLANEOUS

## 2017-07-13 DIAGNOSIS — Z87.81 S/P ORIF (OPEN REDUCTION INTERNAL FIXATION) FRACTURE: ICD-10-CM

## 2017-07-13 DIAGNOSIS — S82.872D CLOSED DISPLACED PILON FRACTURE OF LEFT TIBIA WITH ROUTINE HEALING, SUBSEQUENT ENCOUNTER: Primary | ICD-10-CM

## 2017-07-13 DIAGNOSIS — Z98.890 S/P ORIF (OPEN REDUCTION INTERNAL FIXATION) FRACTURE: ICD-10-CM

## 2017-07-13 PROCEDURE — 99024 POSTOP FOLLOW-UP VISIT: CPT | Mod: S$GLB,,, | Performed by: PHYSICIAN ASSISTANT

## 2017-07-13 PROCEDURE — 99999 PR PBB SHADOW E&M-EST. PATIENT-LVL I: CPT | Mod: PBBFAC,,, | Performed by: PHYSICIAN ASSISTANT

## 2017-07-13 NOTE — PROGRESS NOTES
Mr. Medel is a 32 year old male who sustained a closed left pilon fracture while at work. Patein treated with external fixation device on 06/09/2017 by  followed by definitive fixation by  on 06/28/2017    He is here today for a post-operative visit after a   1.  Open treatment of left distal tibial shaft/metaphyseal fracture with internal fixation  2.  Removal of external fixation, under anesthesia, left leg   3.  Fasciocutaneous flaps left medial ankle 10cm  by   on 06/28/2017.    he reports that he is doing ok.  Pain is controlled.  he is  taking pain medication.  he denies fever, chills, and sweats since the time of the surgery.     Physical exam:  Post op dressing taken down.  Incision is clean, dry and intact.  Sutures removed without difficulty.  Steri strips placed.     Assessment:  Post-op visit ( 2 weeks)    Plan:  - SLC  - NWB  - pain medication: no refill needed at this time  - return to clinic in 4 weeks at time x-ray of his ankle is needed OOC.

## 2017-07-14 NOTE — OP NOTE
DATE OF PROCEDURE:  06/28/2017    PREOPERATIVE DIAGNOSIS:  Left distal tibial shaft fracture, status post external   fixation.    POSTOPERATIVE DIAGNOSES:  1.  Left distal tibial shaft fracture, status post external fixation.  2.  Extensive tissue contracture/scar secondary to prior ankle surgery, left   ankle.    PROCEDURES PERFORMED:  1.  Open treatment of left distal tibial shaft/metaphyseal fracture with   internal fixation.  2.  Removal of external fixation under anesthesia, left leg.  3.  Fasciocutaneous flaps, left medial ankle, 10 cm.    SURGEON:  Brandyn Dhillon M.D.    ASSISTANT:  Norberto Haque M.D. (RES)    ANESTHESIA:  General endotracheal.    ESTIMATED BLOOD LOSS:  10 mL.    IV FLUIDS:  2000 mL crystalloid.    IMPLANTS:  Synthes medial distal tibial plate.    INDICATIONS FOR PROCEDURE:  The patient is a 31-year-old male who was struck by   a large object sustaining a fracture of the left distal tibial metaphysis.  The   patient has a prior history of ankle fixation with a long lateral plate and   medial suture anchors for deltoid repair.  The patient was in some varus   secondary to the injury at that site.  He was treated with external fixation   several weeks ago and has had some swelling and some scab in that area.  He is   now returning to the Operating Room for definitive fixation.  Risks, benefits   and alternatives to surgery were discussed with the patient prior to going to   the Operating Room.  Informed consent was obtained.    PROCEDURE IN DETAIL:  The patient was identified in the preoperative holding   area and the site was marked.  The patient was wheeled into the Operating Room   and placed on the operating table in the supine position.  General endotracheal   anesthesia was induced and preoperative antibiotics were administered.  The left   lower extremity was placed in a nonsterile tourniquet and prepped and draped in   sterile fashion with the external fixator on.  A timeout was  undertaken to   confirm patient, site, surgery, surgeon and administration of preoperative   antibiotics.  All agreed and we proceeded.    I loosened the medial external fixator bar and dialyzed this with Betadine.  I   lengthened the medial side, trying to get a little bit of the varus out.  I then   tightened this and changed gloves.    The patient had an old incision, which went on the posteromedial aspect of the   distal tibia and harked around more anterior at the end from his prior ankle   surgery seven years ago.  I had to follow this and I got full-thickness through   this and then up to the distal shaft of the bone.  I developed a full thickness   flap overlying the distal tibia in order to preserve good tissue for closure.    After developing this to the anterior and posterior aspect of the tibia, I did   squeeze out a good deal of hematoma at the fracture site.  It became apparent to   me at that point in time to have a contracture of the scar from that prior   surgery, which was fairly significant, so I began to undermin bluntly with   Luis retractor and sharply with a 10 blade in order to free up the entire   soft tissue envelope around the distal tibia as I thought there was no way after   fixing this I get a tension-free closure unless we mobilize some   fasciocutaneous flaps.  After getting significantly mobilized distally, I then   reexamined the fracture site in AP and lateral fluoroscopy and this was overall   well aligned, but was an oddly shaped distal tibia secondary to prior injury.    At this point, I selected a longer distal medial tibial plate, put this at the   appropriate place distally and secured with a K-wire and then with a single   cortical screw through a locked hole to pull the plate down to bone.  I placed   it to fit the bone well here, so I put a push-pull device in the metaphyseal   portion of the bone and then used this to pull the plate further down to bone   and I placed  a cortical screw just above this and like a lug nuts went back and   forth between the push-pull device and the screw in order to pull the plate down   to bone and get a shape to fit well.  This also served as an antiglide to close   down this slight lateralization that had occurred on the distal part of the   shaft.  After pulling this plate down to bone such that my length was in good   position, I placed a single cortical screw just above the fracture site in the   shaft of the bone under fluoroscopic guidance through a percutaneous hole.  This   served to pull the plate down to bone proximally.  I still had only minor   distraction at the fracture site.  I then placed a screw directly through the   plate across the fracture pulling the fracture from lateral to medial at the   plate and at this point, I had a very good reduction.    At this point, I went ahead and put two more screws through percutaneous holes   into the shaft of the bone proximally for a total of three.  I then turned my   attention distally and felt the lock screw holes and then removed the initial   distal cortical screw and left this hole blank as this was in a prominent area   under the skin.  I placed several more lock screws distal.  I removed the   push-pull device and placed a lock screw where this had been.  At this point, I   had a good combination of distal locking unicortical screws and then cortical   screw across the fracture and proximally.  I visualized the whole construct   under fluoroscopy and had very good reduction, very good length and alignment.    At this point, the skin flaps were still under tension and I did not think I   would get again a tension-free closure and so I used a Lius retractor and   mobilized the tissue around the anterior and posterior portions of the ankle   until I had good loose full thickness flaps to close.  Tourniquet was let down   and hemostasis was obtained with electrocautery.  Wounds were  copiously   irrigated with normal saline solution and then the fascia on the distal incision   was closed with 0 Vicryl suture in a tension free manner and subcutaneous   tissue with 2-0 Vicryl suture and the skin with 3-0 nylon suture in vertical   mattress fashion.  The percutaneous holes for screw insertion were closed with   2-0 Vicryl sutures in subcutaneous tissue and 3-0 nylon sutures in the skin.    After the wounds were closed, the remainder of the external fixator was broken   down.  The clamps were removed and the pins were taken out of the tibia and the   calcaneus and those sites curetted.  Sterile dressings were applied throughout.    All instrument and sponge counts were reported correct at the end of the case.    There were no complications.  The patient was extubated, awakened and taken to   the Recovery Room in stable condition.    PLAN FOR THE PATIENT:  Will be to have his sutures removed in two to three   weeks' time and likely go into a hard cast until the six-week ann marie and then   begin range of motion.  He will be nonweightbearing for 10 weeks   postoperatively.      NORA  dd: 06/28/2017 13:08:49 (CDT)  td: 07/14/2017 05:04:55 (CDT)  Doc ID   #[id number]  Job ID #[id number]    CC:

## 2017-08-15 ENCOUNTER — OFFICE VISIT (OUTPATIENT)
Dept: ORTHOPEDICS | Facility: CLINIC | Age: 32
End: 2017-08-15
Payer: OTHER MISCELLANEOUS

## 2017-08-15 ENCOUNTER — HOSPITAL ENCOUNTER (OUTPATIENT)
Dept: RADIOLOGY | Facility: HOSPITAL | Age: 32
Discharge: HOME | End: 2017-08-15
Attending: ORTHOPAEDIC SURGERY

## 2017-08-15 DIAGNOSIS — Z98.890 S/P ORIF (OPEN REDUCTION INTERNAL FIXATION) FRACTURE: ICD-10-CM

## 2017-08-15 DIAGNOSIS — Z98.890 STATUS POST SURGERY: ICD-10-CM

## 2017-08-15 DIAGNOSIS — S82.872D CLOSED DISPLACED PILON FRACTURE OF LEFT TIBIA WITH ROUTINE HEALING, SUBSEQUENT ENCOUNTER: ICD-10-CM

## 2017-08-15 DIAGNOSIS — Z87.81 S/P ORIF (OPEN REDUCTION INTERNAL FIXATION) FRACTURE: ICD-10-CM

## 2017-08-15 DIAGNOSIS — Z98.890 STATUS POST SURGERY: Primary | ICD-10-CM

## 2017-08-15 PROCEDURE — 99999 PR PBB SHADOW E&M-EST. PATIENT-LVL II: CPT | Mod: PBBFAC,,, | Performed by: PHYSICIAN ASSISTANT

## 2017-08-15 PROCEDURE — 73610 X-RAY EXAM OF ANKLE: CPT | Mod: 26,LT,, | Performed by: RADIOLOGY

## 2017-08-15 PROCEDURE — 73610 X-RAY EXAM OF ANKLE: CPT | Mod: TC,LT

## 2017-08-15 PROCEDURE — 99024 POSTOP FOLLOW-UP VISIT: CPT | Mod: S$GLB,,, | Performed by: PHYSICIAN ASSISTANT

## 2017-08-18 NOTE — PROGRESS NOTES
Mr. Medel is a 32 year old male who sustained a closed left pilon fracture while at work. Patein treated with external fixation device on 06/09/2017 by  followed by definitive fixation by  on 06/28/2017    He is here today for a post-operative visit after a   1.  Open treatment of left distal tibial shaft/metaphyseal fracture with internal fixation  2.  Removal of external fixation, under anesthesia, left leg   3.  Fasciocutaneous flaps left medial ankle 10cm  by   on 06/28/2017.    he reports that he is doing ok.  Pain is controlled.  he is not taking pain medication.  he denies fever, chills, and sweats since the time of the surgery.     Physical exam: dressing taken down.  Incision is clean, dry and intact.  Mild swelling, decreased range of motion in all planes.     RADS: hardware in good position, no failure noted,     Assessment:  Post-op visit ( 6 weeks)    Plan:  - CAM boot will remove for daily ROM  - NWB  - pain medication: no refill needed at this time  - return to clinic in 6 weeks at time x-ray of his ankle is needed at time consider advance to weight bearing as tolerated.

## 2017-09-26 ENCOUNTER — HOSPITAL ENCOUNTER (OUTPATIENT)
Dept: RADIOLOGY | Facility: HOSPITAL | Age: 32
Discharge: HOME | End: 2017-09-26
Attending: PHYSICIAN ASSISTANT

## 2017-09-26 ENCOUNTER — OFFICE VISIT (OUTPATIENT)
Dept: ORTHOPEDICS | Facility: CLINIC | Age: 32
End: 2017-09-26

## 2017-09-26 DIAGNOSIS — S82.872D CLOSED DISPLACED PILON FRACTURE OF LEFT TIBIA WITH ROUTINE HEALING, SUBSEQUENT ENCOUNTER: ICD-10-CM

## 2017-09-26 DIAGNOSIS — Z98.890 S/P ORIF (OPEN REDUCTION INTERNAL FIXATION) FRACTURE: ICD-10-CM

## 2017-09-26 DIAGNOSIS — Z87.81 S/P ORIF (OPEN REDUCTION INTERNAL FIXATION) FRACTURE: ICD-10-CM

## 2017-09-26 DIAGNOSIS — M25.572 ACUTE LEFT ANKLE PAIN: ICD-10-CM

## 2017-09-26 DIAGNOSIS — T14.8XXA FRACTURE: Primary | ICD-10-CM

## 2017-09-26 PROCEDURE — 73610 X-RAY EXAM OF ANKLE: CPT | Mod: TC,LT

## 2017-09-26 PROCEDURE — 99212 OFFICE O/P EST SF 10 MIN: CPT | Mod: PBBFAC,25 | Performed by: PHYSICIAN ASSISTANT

## 2017-09-26 PROCEDURE — 99024 POSTOP FOLLOW-UP VISIT: CPT | Mod: ,,, | Performed by: PHYSICIAN ASSISTANT

## 2017-09-26 PROCEDURE — 99999 PR PBB SHADOW E&M-EST. PATIENT-LVL II: CPT | Mod: PBBFAC,,, | Performed by: PHYSICIAN ASSISTANT

## 2017-09-26 PROCEDURE — 73610 X-RAY EXAM OF ANKLE: CPT | Mod: 26,LT,, | Performed by: RADIOLOGY

## 2017-09-27 ENCOUNTER — DOCUMENTATION ONLY (OUTPATIENT)
Dept: ORTHOPEDICS | Facility: CLINIC | Age: 32
End: 2017-09-27

## 2017-09-27 NOTE — PROGRESS NOTES
Mr. Medel is a 32 year old male who sustained a closed left pilon fracture while at work. Patein treated with external fixation device on 06/09/2017 by  followed by definitive fixation by  on 06/28/2017    He is here today for a post-operative visit after a   1.  Open treatment of left distal tibial shaft/metaphyseal fracture with internal fixation  2.  Removal of external fixation, under anesthesia, left leg   3.  Fasciocutaneous flaps left medial ankle 10cm  by   on 06/28/2017.    he reports that he is doing ok.  Pain is controlled.  he is not taking pain medication.  he denies fever, chills, and sweats since the time of the surgery.     Physical exam: arrived to clinic in wheelchair with boot in place.Mild swelling, decrease range of motion in all planes.      RADS: hardware in good position, no failure noted,     Assessment:  Post-op visit ( 12 weeks)    Plan:  - he may wash the area with antibacterial soap in the shower. Will not submerge until the incision is completely healed  -PT/OT   - CAM boot will remove for daily ROM   - advanced to weight bearing as tolerated, patient cautioned to take it slow and to only go within limits of pain.   - Order for bine stimulator placed.   - pain medication: no refill needed at this time  - return to clinic in 6 weeks at time x-ray of his ankle is needed

## 2017-10-27 ENCOUNTER — DOCUMENTATION ONLY (OUTPATIENT)
Dept: ORTHOPEDICS | Facility: CLINIC | Age: 32
End: 2017-10-27

## 2017-10-27 NOTE — PROGRESS NOTES
Two [facility] Form 1010 one for bone stimulator and one for therapy [facility] [phone]. Done.

## 2017-12-05 ENCOUNTER — HOSPITAL ENCOUNTER (OUTPATIENT)
Dept: RADIOLOGY | Facility: HOSPITAL | Age: 32
Discharge: HOME | End: 2017-12-05
Attending: PHYSICIAN ASSISTANT

## 2017-12-05 ENCOUNTER — OFFICE VISIT (OUTPATIENT)
Dept: ORTHOPEDICS | Facility: CLINIC | Age: 32
End: 2017-12-05
Payer: OTHER MISCELLANEOUS

## 2017-12-05 ENCOUNTER — TELEPHONE (OUTPATIENT)
Dept: ORTHOPEDICS | Facility: CLINIC | Age: 32
End: 2017-12-05

## 2017-12-05 DIAGNOSIS — M25.572 ACUTE LEFT ANKLE PAIN: Primary | ICD-10-CM

## 2017-12-05 DIAGNOSIS — M25.572 ACUTE LEFT ANKLE PAIN: ICD-10-CM

## 2017-12-05 PROCEDURE — 99999 PR PBB SHADOW E&M-EST. PATIENT-LVL II: CPT | Mod: PBBFAC,,, | Performed by: PHYSICIAN ASSISTANT

## 2017-12-05 PROCEDURE — 73610 X-RAY EXAM OF ANKLE: CPT | Mod: TC,LT

## 2017-12-05 PROCEDURE — 99213 OFFICE O/P EST LOW 20 MIN: CPT | Mod: S$GLB,,, | Performed by: PHYSICIAN ASSISTANT

## 2017-12-05 PROCEDURE — 73610 X-RAY EXAM OF ANKLE: CPT | Mod: 26,LT,, | Performed by: RADIOLOGY

## 2017-12-05 NOTE — TELEPHONE ENCOUNTER
----- Message from Lianet Menchaca sent at 12/5/2017 10:36 AM CST -----  Contact: self   Pt called stating he was told he needed to be seen 6 weeks from the last time he was here. He said there were no appts made and would like to know if Lavonne would like him to make a follow up appt. Pt would also like to know when he will be cleared to return to work. [phone]

## 2017-12-05 NOTE — TELEPHONE ENCOUNTER
Called and Informed patient of appointment on 12/05/17 at 1:45 pm. Pt states that he will call back. Patient states verbal understanding and has no further questions.

## 2017-12-06 NOTE — PROGRESS NOTES
Mr. Medel is a 32 year old male who sustained a closed left pilon fracture while at work. Patein treated with external fixation device on 06/09/2017 by  followed by definitive fixation by  on 06/28/2017    He is here today for a post-operative visit after a   1.  Open treatment of left distal tibial shaft/metaphyseal fracture with internal fixation  2.  Removal of external fixation, under anesthesia, left leg   3.  Fasciocutaneous flaps left medial ankle 10cm  by   on 06/28/2017.    he reports that he is doing ok.  Pain is controlled.  he is not taking pain medication.   He is in PT and is doing very well with this.    he denies fever, chills, and sweats since the time of the surgery.     Physical exam: walked into clinic unassisted,, Mild swelling, decrease range of motion in all planes.no TTP NVI    RADS: hardware in good position, no failure noted, increased callus formation    Assessment:  Post-op visit ( 18 weeks)    Plan:  -PT/OT   - range of motion as tolerated   - weight bearing as tolerated   - continue bone stimulator    - pain medication: no refill needed at this time  - return to clinic in 12 weeks with  at time x-ray of his ankle is needed    Occupation: : advanced to moderate duty.

## 2018-06-18 NOTE — TRANSFER OF CARE
"Anesthesia Transfer of Care Note    Patient: Nick Medel    Procedure(s) Performed: Procedure(s) (LRB):  APPLICATION-EXTERNAL FIXATION DEVICE - left ankle spanning (Left)    Patient location: PACU    Anesthesia Type: general    Transport from OR: Transported from OR on room air with adequate spontaneous ventilation    Post pain: adequate analgesia    Post assessment: no apparent anesthetic complications and tolerated procedure well    Post vital signs: stable    Level of consciousness: awake, alert and oriented    Nausea/Vomiting: no nausea/vomiting    Complications: none    Transfer of care protocol was followed      Last vitals:   Visit Vitals  BP (!) 156/77   Pulse 66   Temp 36.8 °C (98.3 °F) (Oral)   Resp 16   Ht 5' 10" (1.778 m)   Wt 81.6 kg (180 lb)   SpO2 99%   BMI 25.83 kg/m²     " Narcisa Hansen is a 62 year old female who is here for follow up of her multiple medical problems including high blood pressure, hypothyroidism and asthma. In general she has been feeling well. In addition to her chronic medical problems, she has complaints of right buttocks pain.     HPI:  Asthma:  Currently taking dulera twice daily and tolerating well.  Uses albuterol inhaler before exercising.  Denies chronic cough or difficulties breathing.    HTN:  Currently taking Enalapril and tolerating well.  Denies any chest pain, shortness of breath, edema, or dizziness.    Hypothyroidism:  Currently on levothyroxine 50 mcg daily and tolerating.  Denies any heat or cold intolerance.  Fatigue is sometimes a problem due to insomnia.    Other complaints: She has been having pain in right buttocks for the last 2-3 months.  Doesn't radiate anywhere.  The pain is a dull ache. States the pain is getting 25% better.  States she takes ibuprofen and is effective.  She goes to chiropractor and massage therapy.  She also does heat and ice.   Denies changes in bowel movements or urination.  No numbness or tingling.  No injury to area.  Stretching also helps.    The following portions of the patient's history were reviewed and updated as appropriate.    Past Medical History:   Diagnosis Date   • Anxiety    • Asthma    • Cataract    • HTN (hypertension)    • Hypothyroidism    • Migraine    • Migraines    • Osteoporosis, unspecified 08/11/2010      Past Surgical History:   Procedure Laterality Date   • Breast surgery     • Cataract extraction, bilateral  2017   • Colonoscopy  3-25-15   • Colonoscopy diagnostic  03/28/2005   • D and c  01/01/1991    D&C   • Dexa bone density axial skeleton  08/11/2010   • Hysterectomy     • Mammo screening bilateral  01/21/2013   • Tonsillectomy and adenoidectomy        Patient Active Problem List   Diagnosis   • Osteopenia   • Migraine   • Hypothyroidism   • HTN (hypertension)   • Asthma   • Allergic  rhinitis   • Environmental allergies   • Congenital retinal pigment epithelial hypertrophy   • NS (nuclear sclerosis)   • Myopia with astigmatism and presbyopia   • Hyperopia with astigmatism and presbyopia   • Cataract, nuclear sclerotic senile   • Anisometropia   • Primary insomnia      Current Outpatient Prescriptions   Medication Sig Dispense Refill   • ibuprofen (MOTRIN) 200 MG tablet Take 200 mg by mouth every 6 hours as needed for Pain.     • enalapril (VASOTEC) 2.5 MG tablet Take 1 tablet by mouth daily. 30 tablet 5   • levothyroxine (SYNTHROID, LEVOTHROID) 50 MCG tablet Take 1.5 tablets (150 mcg) on MWF and 1 tablet (50 mcg) the rest of the week. 36 tablet 5   • mometasone-formoterol (DULERA) 100-5 MCG/ACT inhaler Inhale 2 puffs into the lungs 2 times daily. 39 g 1   • butalbital-aspirin-caffeine (FIORINAL) capsule Take 1 capsule by mouth every 4 hours as needed for Headaches. 30 capsule 3   • tazarotene (TAZORAC) 0.05 % gel apply by topical route a small pea-sized amount every night to entire face 30 g 3   • clindamycin-benzoyl peroxide 1.2-5 % gel apply by topical route daily to acne-prone areas 45 g 3   • albuterol 108 (90 Base) MCG/ACT inhaler Inhale 2 puffs into the lungs 4 times daily. 54 g 3   • VITAMIN B COMPLEX-C PO Take 1 tablet by mouth.     • Calcium-Vitamin D 500-125 MG-UNIT TABS Take 1-2 tablets by mouth daily.     • traZODone (DESYREL) 50 MG tablet Take 1 tablet by mouth nightly. 30 tablet 5   • doxycycline hyclate (VIBRA-TABS) 100 MG tablet take 1 tablet by oral route 2 times every day (Patient taking differently: Take by mouth as needed. ) 60 tablet 2   • fexofenadine (ALLEGRA) 180 MG tablet Take 180 mg by mouth daily as needed. Indications: Hayfever       No current facility-administered medications for this visit.       ALLERGIES:   Allergen Reactions   • Dander Other (See Comments)     sneezing   • Pollen Extract Other (See Comments)      Social History     Social History   • Marital  status:      Spouse name: N/A   • Number of children: N/A   • Years of education: N/A     Social History Main Topics   • Smoking status: Never Smoker   • Smokeless tobacco: Never Used   • Alcohol use No   • Drug use: No   • Sexual activity: Not Currently     Partners: Male     Birth control/ protection: Post-menopausal, Surgical     Other Topics Concern   • None     Social History Narrative   • None     Family History   Problem Relation Age of Onset   • Macular degeneration Father    • Cataracts Father    • COPD Father    • High blood pressure Father    • Asthma Mother    • Other Sister         MRSA infection   • High blood pressure Sister    • Arthritis Sister        EXAMINATION:  VITALS:    Vitals:    06/18/18 0859   BP: 118/80   Pulse: 79   Resp: 16   Weight: 76.5 kg   Height: 5' 1\" (1.549 m)   PainSc:  0     GENERAL APPEARANCE: appears consistent with age, is well groomed and in no acute distress.  EYE: sclera are anicteric, non-inflamed, pupils equal, round and reactive to light, conjunctiva clear and extraocular movements intact  ENT: oral mucosa pink and moist, no oropharyngeal lesions noted, tympanic membranes pearly grey, lips without lesions and nares patent  NECK: Neck supple without adenopathy, thyromegaly or masses.  SKIN: warm and dry, no rashes on visualized skin, no significant lesions noted.  LUNG: clear to auscultation bilaterally with good air movement and breathing nonlabored  CARDIOVASCULAR: rhythm regular, normal S1, S2, no murmurs, no peripheral edema. Tara carotid bruits.  ABDOMEN: soft, non-tender, no hepatosplenomegaly, no masses, bowel sounds are active.  MUSCULOSKELETAL: no deformity of upper or lower extremities, normal gait and station.  No pain on palpation to left buttocks and no bruising, swelling or redness noted.  Full ROM of leg and hip and no pain.  NEUROLOGIC: no gross motor deficits and CN II - XII intact.  PSYCHIATRIC: well oriented X3, adequate memory, good historian,  mood/affect are appropriate.    The following labs were reviewed:  Lab Services on 12/05/2017   Component Date Value Ref Range Status   • Fasting Status 12/05/2017 12  hrs Final   • Sodium 12/05/2017 144  135 - 145 mmol/L Final   • Potassium 12/05/2017 4.1  3.4 - 5.1 mmol/L Final   • Chloride 12/05/2017 106  98 - 107 mmol/L Final   • Carbon Dioxide 12/05/2017 29  21 - 32 mmol/L Final   • Anion Gap 12/05/2017 13  10 - 20 mmol/L Final   • Glucose 12/05/2017 87  65 - 99 mg/dL Final   • BUN 12/05/2017 12  6 - 20 mg/dL Final   • Creatinine 12/05/2017 0.75  0.51 - 0.95 mg/dL Final   • GFR Estimate,  12/05/2017 >90   Final    eGFR results = or >90 mL/min/1.73m2 = Normal kidney function.   • GFR Estimate, Non  12/05/2017 85   Final    eGFR 60 - 89 mL/min/1.73m2 = Mild decrease in kidney function.   • BUN/Creatinine Ratio 12/05/2017 16  7 - 25 Final   • CALCIUM 12/05/2017 8.8  8.4 - 10.2 mg/dL Final   • TOTAL BILIRUBIN 12/05/2017 0.5  0.2 - 1.0 mg/dL Final   • AST/SGOT 12/05/2017 10  <38 Units/L Final   • ALT/SGPT 12/05/2017 24  <79 Units/L Final   • ALK PHOSPHATASE 12/05/2017 75  45 - 117 Units/L Final   • TOTAL PROTEIN 12/05/2017 7.0  6.4 - 8.2 g/dL Final   • Albumin 12/05/2017 3.9  3.6 - 5.1 g/dL Final   • GLOBULIN 12/05/2017 3.1  2.0 - 4.0 g/dL Final   • A/G Ratio, Serum 12/05/2017 1.3  1.0 - 2.4 Final   • TSH 12/05/2017 2.917  0.350 - 5.000 mcUnits/mL Final   • FASTING STATUS 12/05/2017 12  hrs Final   • CHOLESTEROL 12/05/2017 181  <200 mg/dL Final    Comment:    Desirable            <200  Borderline High      200 to 239  High                 >=240        • CALCULATED LDL 12/05/2017 87  <130 mg/dL Final    Comment: OPTIMAL               <100  NEAR OPTIMAL          100-129  BORDERLINE HIGH       130-159  HIGH                  160-189  VERY HIGH             >=190     • HDL 12/05/2017 74  >49 mg/dL Final    Comment: Low            <40  Borderline Low 40 to 49  Near Optimal   50 to  59  Optimal        >=60     • TRIGLYCERIDE 12/05/2017 99  <150 mg/dL Final    Comment: Normal                   <150  Borderline High          150 to 199  High                     200 to 499  Very High                >=500     • CALCULATED NON HDL 12/05/2017 107  mg/dL Final    Comment:    Therapeutic Target:  CHD and risk equivalents <130  Multiple risk factors    <160  0 to 1 risk factors      <190        • CHOL/HDL 12/05/2017 2.4  <4.5 Final   • HEPATITIS C ANTIBODY 12/05/2017 NEGATIVE  NEGATIVE Final       ASSESSMENT:  1. Uncomplicated asthma, unspecified asthma severity, unspecified whether persistent    2. Essential hypertension    3. Hypothyroidism, unspecified type    4. Osteopenia, unspecified location    5. Vitamin D deficiency    6. Primary insomnia    7. Gluteal pain    8. Lipid screening        PLAN:  1. Has been controlled. Continue current medication management.    2. Blood pressure within goal. Continue current medication management.    3. Thyroid levels have been stable. Continue current medication. Check TSH in 6 months.    4. Asymptomatic. Continue calcium and vitamin D supplementation    5. Will check vitamin D levels in 6 months. Continue vitamin D supplementation    6. Discussed good sleep hygiene. Will start trazodone 50 mg nightly. Discussed side effects and benefits. She will call in 1-2 weeks to let us know how effective this has been for her.    7. Seems to be musculoskeletal in nature. Will have her continue current conservative measures and that is improving. If symptoms persist or worsen will consider further imaging.    Physical in 6 months with fasting CMP, lipids, vitamin D and TSH.    Patient verbalized understanding and is agreeable to treatment plan.    TASH Suarez

## 2022-09-22 NOTE — ANESTHESIA PROCEDURE NOTES
Popliteal Sciatic Nerve Catheter    Patient location during procedure: pre-op   Block not for primary anesthetic.  Reason for block: at surgeon's request and post-op pain management   Post-op Pain Location: L foot pain  Start time: 6/28/2017 2:32 PM  Timeout: 6/28/2017 2:30 PM   End time: 6/28/2017 2:38 PM  Staffing  Anesthesiologist: VEDA BRIZUELA  Resident/CRNA: KAY PERALTA  Other anesthesia staff: LUIS FRY  Performed: resident/CRNA   Preanesthetic Checklist  Completed: patient identified, site marked, surgical consent, pre-op evaluation, timeout performed, IV checked, risks and benefits discussed and monitors and equipment checked  Peripheral Block  Patient position: supine  Prep: ChloraPrep and site prepped and draped  Patient monitoring: heart rate, cardiac monitor, continuous pulse ox, continuous capnometry and frequent blood pressure checks  Block type: popliteal  Laterality: left  Injection technique: continuous  Needle  Needle type: Tuohy   Needle gauge: 18 G  Needle length: 3.5 in  Needle localization: anatomical landmarks and ultrasound guidance  Catheter type: non-stimulating  Catheter size: 20 G  Test dose: lidocaine 1.5% with Epi 1-to-200,000 and negative   -ultrasound image captured on disc.  Assessment  Injection assessment: negative aspiration, negative parasthesia and local visualized surrounding nerve  Paresthesia pain: none  Heart rate change: no  Slow fractionated injection: yes  Medications:  Bolus administered: 30 mL of 0.25 ropivacaine  Epinephrine added: 3.75 mcg/mL (1/300,000)  Additional Notes  VSS.  [facility] RN monitoring vitals throughout procedure.  Patient tolerated procedure well.               
No
